# Patient Record
Sex: MALE | Race: WHITE | NOT HISPANIC OR LATINO | Employment: OTHER | RURAL
[De-identification: names, ages, dates, MRNs, and addresses within clinical notes are randomized per-mention and may not be internally consistent; named-entity substitution may affect disease eponyms.]

---

## 2017-02-02 RX ORDER — AMLODIPINE AND VALSARTAN 5; 320 MG/1; MG/1
1 TABLET ORAL NIGHTLY
Qty: 30 TABLET | Refills: 6 | Status: SHIPPED | OUTPATIENT
Start: 2017-02-02 | End: 2017-12-15 | Stop reason: SDUPTHER

## 2017-02-02 RX ORDER — PRAVASTATIN SODIUM 40 MG
40 TABLET ORAL NIGHTLY
Qty: 30 TABLET | Refills: 6 | Status: SHIPPED | OUTPATIENT
Start: 2017-02-02 | End: 2017-12-15 | Stop reason: SDUPTHER

## 2017-12-15 ENCOUNTER — OFFICE VISIT (OUTPATIENT)
Dept: CARDIOLOGY | Facility: CLINIC | Age: 67
End: 2017-12-15

## 2017-12-15 VITALS
DIASTOLIC BLOOD PRESSURE: 80 MMHG | SYSTOLIC BLOOD PRESSURE: 140 MMHG | BODY MASS INDEX: 21.12 KG/M2 | WEIGHT: 164.6 LBS | HEIGHT: 74 IN | HEART RATE: 68 BPM

## 2017-12-15 DIAGNOSIS — Z72.0 TOBACCO ABUSE: ICD-10-CM

## 2017-12-15 DIAGNOSIS — I10 BENIGN HYPERTENSION: ICD-10-CM

## 2017-12-15 DIAGNOSIS — E78.00 HYPERCHOLESTEROLEMIA: ICD-10-CM

## 2017-12-15 DIAGNOSIS — I25.10 CORONARY ARTERY DISEASE INVOLVING NATIVE CORONARY ARTERY OF NATIVE HEART WITHOUT ANGINA PECTORIS: Primary | ICD-10-CM

## 2017-12-15 PROCEDURE — 99214 OFFICE O/P EST MOD 30 MIN: CPT | Performed by: INTERNAL MEDICINE

## 2017-12-15 RX ORDER — AMLODIPINE AND VALSARTAN 5; 320 MG/1; MG/1
1 TABLET ORAL NIGHTLY
Qty: 90 TABLET | Refills: 6 | Status: SHIPPED | OUTPATIENT
Start: 2017-12-15 | End: 2018-12-21 | Stop reason: SDUPTHER

## 2017-12-15 RX ORDER — PRAVASTATIN SODIUM 40 MG
40 TABLET ORAL NIGHTLY
Qty: 90 TABLET | Refills: 1 | Status: SHIPPED | OUTPATIENT
Start: 2017-12-15 | End: 2018-06-20 | Stop reason: SDUPTHER

## 2018-06-20 RX ORDER — PRAVASTATIN SODIUM 40 MG
TABLET ORAL
Qty: 90 TABLET | Refills: 0 | Status: SHIPPED | OUTPATIENT
Start: 2018-06-20 | End: 2018-08-17 | Stop reason: SDUPTHER

## 2018-08-17 RX ORDER — PRAVASTATIN SODIUM 40 MG
40 TABLET ORAL
Qty: 90 TABLET | Refills: 1 | Status: SHIPPED | OUTPATIENT
Start: 2018-08-17 | End: 2019-01-16 | Stop reason: SDUPTHER

## 2018-12-21 RX ORDER — AMLODIPINE AND VALSARTAN 5; 320 MG/1; MG/1
TABLET ORAL
Qty: 90 TABLET | Refills: 0 | Status: SHIPPED | OUTPATIENT
Start: 2018-12-21 | End: 2019-01-14 | Stop reason: SDUPTHER

## 2019-01-14 ENCOUNTER — OFFICE VISIT (OUTPATIENT)
Dept: CARDIOLOGY | Facility: CLINIC | Age: 69
End: 2019-01-14

## 2019-01-14 VITALS
WEIGHT: 176 LBS | HEART RATE: 77 BPM | DIASTOLIC BLOOD PRESSURE: 70 MMHG | BODY MASS INDEX: 23.33 KG/M2 | HEIGHT: 73 IN | SYSTOLIC BLOOD PRESSURE: 134 MMHG

## 2019-01-14 DIAGNOSIS — Z72.0 TOBACCO ABUSE: ICD-10-CM

## 2019-01-14 DIAGNOSIS — I10 BENIGN HYPERTENSION: ICD-10-CM

## 2019-01-14 DIAGNOSIS — I25.10 CORONARY ARTERY DISEASE INVOLVING NATIVE CORONARY ARTERY OF NATIVE HEART WITHOUT ANGINA PECTORIS: Primary | ICD-10-CM

## 2019-01-14 DIAGNOSIS — E78.00 HYPERCHOLESTEROLEMIA: ICD-10-CM

## 2019-01-14 PROCEDURE — 99214 OFFICE O/P EST MOD 30 MIN: CPT | Performed by: INTERNAL MEDICINE

## 2019-01-14 RX ORDER — PREDNISONE 1 MG/1
5 TABLET ORAL DAILY
COMMUNITY
End: 2020-01-20

## 2019-01-14 RX ORDER — AMLODIPINE AND VALSARTAN 5; 320 MG/1; MG/1
1 TABLET ORAL DAILY
Qty: 90 TABLET | Refills: 4 | Status: SHIPPED | OUTPATIENT
Start: 2019-01-14 | End: 2020-01-20 | Stop reason: SDUPTHER

## 2019-01-14 RX ORDER — RANITIDINE 150 MG/1
150 TABLET ORAL 2 TIMES DAILY
COMMUNITY
End: 2020-01-20

## 2019-01-14 RX ORDER — NAPROXEN 500 MG/1
500 TABLET ORAL 2 TIMES DAILY WITH MEALS
COMMUNITY
End: 2021-01-25

## 2019-01-14 RX ORDER — SULFASALAZINE 500 MG/1
1000 TABLET ORAL 2 TIMES DAILY
COMMUNITY
End: 2021-01-25

## 2019-01-14 NOTE — PROGRESS NOTES
[facility]  Office visit  Boo White  1950  [phone]  VISIT DATE:  12/15/2017    PCP: Renny Ardon MD  [address] [place], [place] [id number]    CC:  No chief complaint on file.      PROBLEM LIST:  1. Coronary artery disease with abnormal Cardiolite:  a. A 6-9 month history of progressive shortness of breath/dyspnea upon exertion.  b. Abnormal EKG revealing inferior ST downsloping.  c. Echocardiogram, 05/15/2012: Ejection fraction normal at 60%, mild diastolic dysfunction, trace mitral regurgitation, mild tricuspid regurgitation with an RVSP at 33.  d. Abnormal Lexiscan Cardiolite: The patient walked 3 minutes with abnormal imaging studies.  e. Left heart catheterization, 08/30/2012: Status post FFR and drug-eluting stent to the mid and proximal RCA using a 4.6 mm Promus stent proximally and 3 x 32 in the mid vessel and stenting of the left circumflex using a 2.75 x 16 mm Promus drug-eluting stent.  2. Benign hypertension  3. Hypercholesterolemia  4. Non-insulin-dependent type 2, diabetes mellitus  5. Ongoing tobacco abuse; 2 packs per day  6. Family history of heart disease  7. Surgical history:  a. Right index finger removed secondary to working accident     ASSESSMENT:   Diagnosis Plan   1. Coronary artery disease involving native coronary artery of native heart without angina pectoris     2. Hypercholesterolemia     3. Benign hypertension     4. Tobacco abuse         PLAN:  Coronary artery disease: Currently stable and asymptomatic.  Continue aspirin, afterload reduction and statin therapy.    Hypertension: Goal less than 130/80 mmHg.  Well-controlled at home.  Continue combination of amlodipine and valsartan    Hyperlipidemia: Goal LDL less than 70.  ...    Nicotine abuse: Counseled on need for smoking cessation.  Previously attempted Chantix.  Currently smoking a pack per day, not ready to try quitting at this time.    Subjective  Reports stable functional  status.  Describes shortness of breath and a class II pattern.  Currently smoking a pack per day, previously smoked as much as 2 packs per day.  Blood pressures running less than 140/90 mmHg.  Denies chest discomfort and palpitations.  Has been started on therapy for underlying inflammatory arthritis.    PHYSICAL EXAMINATION:  There were no vitals filed for this visit.  General Appearance:    Alert, cooperative, no distress, appears stated age   Head:    Normocephalic, without obvious abnormality, atraumatic   Eyes:    conjunctiva/corneas clear   Nose:   Nares normal, septum midline, mucosa normal, no drainage   Throat:   Lips, teeth and gums normal   Neck:   Supple, symmetrical, trachea midline, no carotid    bruit or JVD   Lungs:     Diminished throughout, respirations unlabored   Chest Wall:    No tenderness or deformity    Heart:    Regular rate and rhythm, S1 and S2 normal, no murmur, rub   or gallop, normal carotid impulse bilaterally without bruit.   Abdomen:     Soft, non-tender   Extremities:   Extremities normal, atraumatic, no cyanosis or edema   Pulses:   2+ and symmetric all extremities   Skin:   Skin color, texture, turgor normal, no rashes or lesions       Diagnostic Data:  Procedures  Lab Results   Component Value Date    TRIG 53 11/18/2016    HDL 50 11/18/2016     Lab Results   Component Value Date    GLUCOSE 112 (H) 11/18/2016    BUN 17 11/18/2016    CREATININE 0.80 11/18/2016     11/18/2016    K 4.1 11/18/2016     (H) 11/18/2016    CO2 25.0 11/18/2016     Lab Results   Component Value Date    HGBA1C 5.20 11/18/2016     Lab Results   Component Value Date    WBC 5.38 11/18/2016    HGB 14.4 11/18/2016    HCT 43.1 11/18/2016     11/18/2016       Allergies  No Known Allergies    Current Medications    Current Outpatient Medications:   •  amLODIPine-valsartan (EXFORGE) 5-320 MG per tablet, TAKE (1) TABLET DAILY FOR HIGH BLOOD PRESSURE., Disp: 90 tablet, Rfl: 0  •  aspirin 81 MG EC  tablet, Take 81 mg by mouth Every Night., Disp: , Rfl:   •  coenzyme Q10 100 MG capsule, Take 200 mg by mouth Every Night., Disp: , Rfl:   •  methotrexate 2.5 MG tablet, Take 20 mg by mouth 1 (One) Time Per Week., Disp: , Rfl:   •  Multiple Vitamins-Minerals (SENTRY SENIOR PO), Take  by mouth Daily., Disp: , Rfl:   •  pravastatin (PRAVACHOL) 40 MG tablet, Take 1 tablet by mouth every night at bedtime., Disp: 90 tablet, Rfl: 1  •  Specialty Vitamins Products (CENTRUM SPECIALIST ENERGY) tablet, Take 1 tablet by mouth Every Night., Disp: , Rfl:           ROS  Review of Systems   Cardiovascular: Negative for chest pain, irregular heartbeat, near-syncope, palpitations and syncope.   Respiratory: Positive for cough, shortness of breath and wheezing. Negative for sputum production.    Neurological: Positive for dizziness.     SOCIAL HX  Social History     Socioeconomic History   • Marital status:      Spouse name: Not on file   • Number of children: Not on file   • Years of education: Not on file   • Highest education level: Not on file   Social Needs   • Financial resource strain: Not on file   • Food insecurity - worry: Not on file   • Food insecurity - inability: Not on file   • Transportation needs - medical: Not on file   • Transportation needs - non-medical: Not on file   Occupational History   • Not on file   Tobacco Use   • Smoking status: Current Every Day Smoker     Packs/day: 1.50   • Smokeless tobacco: Never Used   • Tobacco comment: 1-1/2 to 2 packs of cigarettes per day    Substance and Sexual Activity   • Alcohol use: No   • Drug use: No   • Sexual activity: Defer   Other Topics Concern   • Not on file   Social History Narrative   • Not on file       FAMILY HX  Family History   Problem Relation Age of Onset   • No Known Problems Mother    • No Known Problems Father              Anthony Riley III, MD, [facility]

## 2019-01-16 RX ORDER — PRAVASTATIN SODIUM 40 MG
40 TABLET ORAL
Qty: 90 TABLET | Refills: 0 | Status: SHIPPED | OUTPATIENT
Start: 2019-01-16 | End: 2019-10-17 | Stop reason: SDUPTHER

## 2019-01-19 ENCOUNTER — RESULTS ENCOUNTER (OUTPATIENT)
Dept: CARDIOLOGY | Facility: CLINIC | Age: 69
End: 2019-01-19

## 2019-01-19 DIAGNOSIS — E78.00 HYPERCHOLESTEROLEMIA: ICD-10-CM

## 2019-01-19 DIAGNOSIS — I10 BENIGN HYPERTENSION: ICD-10-CM

## 2019-10-17 RX ORDER — PRAVASTATIN SODIUM 40 MG
40 TABLET ORAL
Qty: 90 TABLET | Refills: 0 | Status: SHIPPED | OUTPATIENT
Start: 2019-10-17 | End: 2020-01-20

## 2020-01-20 ENCOUNTER — OFFICE VISIT (OUTPATIENT)
Dept: CARDIOLOGY | Facility: CLINIC | Age: 70
End: 2020-01-20

## 2020-01-20 VITALS
HEART RATE: 82 BPM | HEIGHT: 73 IN | WEIGHT: 170.6 LBS | SYSTOLIC BLOOD PRESSURE: 130 MMHG | BODY MASS INDEX: 22.61 KG/M2 | DIASTOLIC BLOOD PRESSURE: 74 MMHG | OXYGEN SATURATION: 96 %

## 2020-01-20 DIAGNOSIS — I25.10 CORONARY ARTERY DISEASE INVOLVING NATIVE CORONARY ARTERY OF NATIVE HEART WITHOUT ANGINA PECTORIS: Primary | ICD-10-CM

## 2020-01-20 DIAGNOSIS — E78.00 HYPERCHOLESTEROLEMIA: ICD-10-CM

## 2020-01-20 DIAGNOSIS — I10 BENIGN HYPERTENSION: ICD-10-CM

## 2020-01-20 PROCEDURE — 99214 OFFICE O/P EST MOD 30 MIN: CPT | Performed by: INTERNAL MEDICINE

## 2020-01-20 RX ORDER — ROSUVASTATIN CALCIUM 40 MG/1
40 TABLET, COATED ORAL DAILY
Qty: 90 TABLET | Refills: 1 | Status: SHIPPED | OUTPATIENT
Start: 2020-01-20 | End: 2020-08-05 | Stop reason: SDUPTHER

## 2020-01-20 RX ORDER — BUDESONIDE AND FORMOTEROL FUMARATE DIHYDRATE 80; 4.5 UG/1; UG/1
2 AEROSOL RESPIRATORY (INHALATION)
COMMUNITY
End: 2022-02-08

## 2020-01-20 RX ORDER — AMLODIPINE AND VALSARTAN 5; 320 MG/1; MG/1
1 TABLET ORAL DAILY
Qty: 90 TABLET | Refills: 4 | Status: SHIPPED | OUTPATIENT
Start: 2020-01-20 | End: 2021-01-25 | Stop reason: SDUPTHER

## 2020-01-20 NOTE — PROGRESS NOTES
[facility]  Office visit  Boo White  1950  [phone]  VISIT DATE:  1/20/2020      PCP: Renny Ardon MD  [address] [place] [id number]    CC:  Chief Complaint   Patient presents with   • Coronary Artery Disease       PROBLEM LIST:  1. Coronary artery disease with abnormal Cardiolite:  a. A 6-9 month history of progressive shortness of breath/dyspnea upon exertion.  b. Abnormal EKG revealing inferior ST downsloping.  c. Echocardiogram, 05/15/2012: Ejection fraction normal at 60%, mild diastolic dysfunction, trace mitral regurgitation, mild tricuspid regurgitation with an RVSP at 33.  d. Abnormal Lexiscan Cardiolite: The patient walked 3 minutes with abnormal imaging studies.  e. Left heart catheterization, 08/30/2012: Status post FFR and drug-eluting stent to the mid and proximal RCA using a 4.6 mm Promus stent proximally and 3 x 32 in the mid vessel and stenting of the left circumflex using a 2.75 x 16 mm Promus drug-eluting stent.  2. Benign hypertension  3. Hypercholesterolemia  4. Non-insulin-dependent type 2, diabetes mellitus  5. Ongoing tobacco abuse; 2 packs per day  6. Family history of heart disease  7. Surgical history:  a. Right index finger removed secondary to working accident     ASSESSMENT:   Diagnosis Plan   1. Coronary artery disease involving native coronary artery of native heart without angina pectoris     2. Benign hypertension     3. Hypercholesterolemia         PLAN:  Coronary artery disease: Currently stable and asymptomatic.  Continue aspirin, afterload reduction and statin therapy.    Hypertension: Goal less than 130/80 mmHg.  Well-controlled at home.  Continue combination of amlodipine and valsartan    Hyperlipidemia: Goal LDL less than 70.  Switching pravastatin over to rosuvastatin 40 mg p.o. daily.    Nicotine abuse: Counseled on need for smoking cessation.  Previously attempted Chantix.  Currently smoking a pack per day,  "not ready to try quitting at this time.    Atypical claudication: We will arrange for ABIs if this worsens.    Subjective  Reports stable functional status.  Describes shortness of breath in a class II pattern with such activities as going up a couple flights of stairs or walking up an incline carrying something.  Currently smoking a pack per day, previously smoked as much as 2 packs per day.  Blood pressures running less than 140/90 mmHg.  Denies chest discomfort and palpitations.  Has been started on therapy for underlying inflammatory arthritis.  Denies exertional claudication.  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  Reports that his feet are often cold    PHYSICAL EXAMINATION:  Vitals:    01/20/20 0921   BP: 130/74   BP Location: Right arm   Patient Position: Sitting   Pulse: 82   SpO2: 96%   Weight: 77.4 kg (170 lb 9.6 oz)   Height: 185.4 cm (73\")     General Appearance:    Alert, cooperative, no distress, appears stated age   Head:    Normocephalic, without obvious abnormality, atraumatic   Eyes:    conjunctiva/corneas clear   Nose:   Nares normal, septum midline, mucosa normal, no drainage   Throat:   Lips, teeth and gums normal   Neck:   Supple, symmetrical, trachea midline, no carotid    bruit or JVD   Lungs:     Diminished throughout, respirations unlabored   Chest Wall:    No tenderness or deformity    Heart:    Regular rate and rhythm, S1 and S2 normal, no murmur, rub   or gallop, normal carotid impulse bilaterally without bruit.   Abdomen:     Soft, non-tender   Extremities:   Extremities normal, atraumatic, no cyanosis or edema   Pulses:  Pedal pulses are difficult to appreciate   Skin:   Skin color, texture, turgor normal, no rashes or lesions       Diagnostic Data:  Procedures  Lab Results   Component Value Date    TRIG 53 11/18/2016    HDL 50 11/18/2016     Lab Results   Component Value Date    GLUCOSE 112 (H) " 11/18/2016    BUN 17 11/18/2016    CREATININE 0.80 11/18/2016     11/18/2016    K 4.1 11/18/2016     (H) 11/18/2016    CO2 25.0 11/18/2016     Lab Results   Component Value Date    HGBA1C 5.20 11/18/2016     Lab Results   Component Value Date    WBC 5.38 11/18/2016    HGB 14.4 11/18/2016    HCT 43.1 11/18/2016     11/18/2016       Allergies  No Known Allergies    Current Medications    Current Outpatient Medications:   •  adalimumab (HUMIRA) 40 MG/0.8ML Prefilled Syringe Kit injection, Inject 40 mg under the skin into the appropriate area as directed Every 14 (Fourteen) Days., Disp: , Rfl:   •  amLODIPine-valsartan (EXFORGE) 5-320 MG per tablet, Take 1 tablet by mouth Daily., Disp: 90 tablet, Rfl: 4  •  aspirin 81 MG EC tablet, Take 81 mg by mouth Every Night., Disp: , Rfl:   •  budesonide-formoterol (SYMBICORT) 80-4.5 MCG/ACT inhaler, Inhale 2 puffs 2 (Two) Times a Day., Disp: , Rfl:   •  coenzyme Q10 100 MG capsule, Take 200 mg by mouth Every Night., Disp: , Rfl:   •  Multiple Vitamins-Minerals (SENTRY SENIOR PO), Take  by mouth Daily., Disp: , Rfl:   •  naproxen (NAPROSYN) 500 MG tablet, Take 500 mg by mouth 2 (Two) Times a Day With Meals., Disp: , Rfl:   •  pravastatin (PRAVACHOL) 40 MG tablet, Take 1 tablet by mouth every night at bedtime., Disp: 90 tablet, Rfl: 0  •  sulfaSALAzine (AZULFIDINE) 500 MG tablet, Take 1,000 mg by mouth 2 (Two) Times a Day., Disp: , Rfl:           ROS  Review of Systems   Cardiovascular: Positive for dyspnea on exertion. Negative for chest pain, irregular heartbeat, near-syncope, palpitations and syncope.   Respiratory: Positive for cough, shortness of breath and wheezing. Negative for sputum production.    Neurological: Positive for dizziness.     SOCIAL HX  Social History     Socioeconomic History   • Marital status:      Spouse name: Not on file   • Number of children: Not on file   • Years of education: Not on file   • Highest education level: Not on  file   Tobacco Use   • Smoking status: Current Every Day Smoker     Packs/day: 1.00     Types: Cigarettes   • Smokeless tobacco: Never Used   Substance and Sexual Activity   • Alcohol use: No   • Drug use: No   • Sexual activity: Defer       FAMILY HX  Family History   Problem Relation Age of Onset   • No Known Problems Mother    • No Known Problems Father              Anthony Riley III, MD, FACC

## 2020-08-05 RX ORDER — ROSUVASTATIN CALCIUM 40 MG/1
40 TABLET, COATED ORAL DAILY
Qty: 90 TABLET | Refills: 0 | Status: SHIPPED | OUTPATIENT
Start: 2020-08-05 | End: 2021-01-04 | Stop reason: SDUPTHER

## 2021-01-04 RX ORDER — ROSUVASTATIN CALCIUM 40 MG/1
40 TABLET, COATED ORAL DAILY
Qty: 90 TABLET | Refills: 0 | Status: SHIPPED | OUTPATIENT
Start: 2021-01-04 | End: 2021-01-25 | Stop reason: SDUPTHER

## 2021-01-25 ENCOUNTER — TELEPHONE (OUTPATIENT)
Dept: CARDIOLOGY | Facility: CLINIC | Age: 71
End: 2021-01-25

## 2021-01-25 ENCOUNTER — OFFICE VISIT (OUTPATIENT)
Dept: CARDIOLOGY | Facility: CLINIC | Age: 71
End: 2021-01-25

## 2021-01-25 VITALS
WEIGHT: 170 LBS | DIASTOLIC BLOOD PRESSURE: 78 MMHG | OXYGEN SATURATION: 96 % | BODY MASS INDEX: 22.53 KG/M2 | SYSTOLIC BLOOD PRESSURE: 138 MMHG | HEIGHT: 73 IN | HEART RATE: 89 BPM | TEMPERATURE: 98.2 F

## 2021-01-25 DIAGNOSIS — I10 BENIGN HYPERTENSION: ICD-10-CM

## 2021-01-25 DIAGNOSIS — I73.9 PVD (PERIPHERAL VASCULAR DISEASE) WITH CLAUDICATION (HCC): ICD-10-CM

## 2021-01-25 DIAGNOSIS — I25.10 CORONARY ARTERY DISEASE INVOLVING NATIVE CORONARY ARTERY OF NATIVE HEART WITHOUT ANGINA PECTORIS: Primary | ICD-10-CM

## 2021-01-25 DIAGNOSIS — Z72.0 TOBACCO ABUSE: ICD-10-CM

## 2021-01-25 DIAGNOSIS — E78.00 HYPERCHOLESTEROLEMIA: ICD-10-CM

## 2021-01-25 PROCEDURE — 99214 OFFICE O/P EST MOD 30 MIN: CPT | Performed by: INTERNAL MEDICINE

## 2021-01-25 RX ORDER — INFLUENZA A VIRUS A/MICHIGAN/45/2015 X-275 (H1N1) ANTIGEN (FORMALDEHYDE INACTIVATED), INFLUENZA A VIRUS A/SINGAPORE/INFIMH-16-0019/2016 IVR-186 (H3N2) ANTIGEN (FORMALDEHYDE INACTIVATED), INFLUENZA B VIRUS B/PHUKET/3073/2013 ANTIGEN (FORMALDEHYDE INACTIVATED), AND INFLUENZA B VIRUS B/MARYLAND/15/2016 BX-69A ANTIGEN (FORMALDEHYDE INACTIVATED) 60; 60; 60; 60 UG/.7ML; UG/.7ML; UG/.7ML; UG/.7ML
INJECTION, SUSPENSION INTRAMUSCULAR
COMMUNITY
Start: 2020-11-11 | End: 2022-01-26

## 2021-01-25 RX ORDER — AMLODIPINE AND VALSARTAN 5; 320 MG/1; MG/1
1 TABLET ORAL DAILY
Qty: 90 TABLET | Refills: 4 | Status: SHIPPED | OUTPATIENT
Start: 2021-01-25 | End: 2022-01-26 | Stop reason: SDUPTHER

## 2021-01-25 RX ORDER — ROSUVASTATIN CALCIUM 40 MG/1
40 TABLET, COATED ORAL DAILY
Qty: 90 TABLET | Refills: 4 | Status: SHIPPED | OUTPATIENT
Start: 2021-01-25 | End: 2022-01-26 | Stop reason: SDUPTHER

## 2021-01-25 NOTE — PROGRESS NOTES
[place] [facility]  Office visit  Boo White  1950  [phone]  VISIT DATE:  1/25/2021      PCP: Renny Ardon MD  [address] [place] [id number]    CC:  Chief Complaint   Patient presents with   • Coronary Artery Disease       PROBLEM LIST:  1. Coronary artery disease with abnormal Cardiolite:  a. A 6-9 month history of progressive shortness of breath/dyspnea upon exertion.  b. Abnormal EKG revealing inferior ST downsloping.  c. Echocardiogram, 05/15/2012: Ejection fraction normal at 60%, mild diastolic dysfunction, trace mitral regurgitation, mild tricuspid regurgitation with an RVSP at 33.  d. Abnormal Lexiscan Cardiolite: The patient walked 3 minutes with abnormal imaging studies.  e. Left heart catheterization, 08/30/2012: Status post FFR and drug-eluting stent to the mid and proximal RCA using a 4.6 mm Promus stent proximally and 3 x 32 in the mid vessel and stenting of the left circumflex using a 2.75 x 16 mm Promus drug-eluting stent.  2. Benign hypertension  3. Hypercholesterolemia  4. Non-insulin-dependent type 2, diabetes mellitus  5. Ongoing tobacco abuse; 2 packs per day  6. Family history of heart disease  7. Surgical history:  a. Right index finger removed secondary to working accident     ASSESSMENT:   Diagnosis Plan   1. Coronary artery disease involving native coronary artery of native heart without angina pectoris     2. Benign hypertension     3. Hypercholesterolemia  Lipid Panel    Comprehensive Metabolic Panel   4. Tobacco abuse     5. PVD (peripheral vascular disease) with claudication (CMS/HCC)  Doppler Arterial Multi Level Lower Extremity - Bilateral CAR       PLAN:  Coronary artery disease: Currently stable and asymptomatic.  Continue aspirin, afterload reduction and statin therapy.    Hypertension: Goal less than 130/80 mmHg.  Well-controlled at home.  Continue combination of amlodipine and valsartan    Hyperlipidemia: Goal LDL less than  "70.  Continue rosuvastatin 40 mg p.o. daily.  Lipid profile pending.    Nicotine abuse: Counseled on need for smoking cessation.  Previously attempted Chantix.  Currently smoking a pack per day, not ready to try quitting at this time.    Atypical claudication: ABIs pending.    Dyspepsia: Currently no high risk clinical features.  Counseled him to follow-up with primary care physician for further evaluation and treatment.    Subjective  Reports stable functional status.  Describes shortness of breath in a class II pattern with such activities as going up a couple flights of stairs or walking up an incline carrying something.  Currently smoking a pack per day, previously smoked as much as 2 packs per day.  Blood pressures running less than 140/90 mmHg.  Denies chest discomfort and palpitations.  Reporting cold feet and intermittent nocturnal lower extremity muscle cramping.  He appears compliant with medical therapy.  Describes episodes of indigestion and heartburn following meals.  Denies weight loss.  No melena.  No dysphagia.    PHYSICAL EXAMINATION:  Vitals:    01/25/21 0908   BP: 138/78   Pulse: 89   Temp: 98.2 °F (36.8 °C)   SpO2: 96%   Weight: 77.1 kg (170 lb)   Height: 185.4 cm (72.99\")     General Appearance:    Alert, cooperative, no distress, appears stated age   Head:    Normocephalic, without obvious abnormality, atraumatic   Eyes:    conjunctiva/corneas clear   Nose:   Nares normal, septum midline, mucosa normal, no drainage   Throat:   Lips, teeth and gums normal   Neck:   Supple, symmetrical, trachea midline, no carotid    bruit or JVD   Lungs:     Diminished throughout, respirations unlabored   Chest Wall:    No tenderness or deformity    Heart:    Regular rate and rhythm, S1 and S2 normal, no murmur, rub   or gallop, normal carotid impulse bilaterally without bruit.   Abdomen:     Soft, non-tender   Extremities:   Extremities normal, atraumatic, no cyanosis or edema   Pulses:  Pedal pulses are " difficult to appreciate   Skin:   Skin color, texture, turgor normal, no rashes or lesions       Diagnostic Data:  Procedures  Lab Results   Component Value Date    TRIG 53 11/18/2016    HDL 50 11/18/2016     Lab Results   Component Value Date    GLUCOSE 112 (H) 11/18/2016    BUN 17 11/18/2016    CREATININE 0.80 11/18/2016     11/18/2016    K 4.1 11/18/2016     (H) 11/18/2016    CO2 25.0 11/18/2016     Lab Results   Component Value Date    HGBA1C 5.20 11/18/2016     Lab Results   Component Value Date    WBC 5.38 11/18/2016    HGB 14.4 11/18/2016    HCT 43.1 11/18/2016     11/18/2016       Allergies  No Known Allergies    Current Medications    Current Outpatient Medications:   •  adalimumab (HUMIRA) 40 MG/0.8ML Prefilled Syringe Kit injection, Inject 40 mg under the skin into the appropriate area as directed Every 14 (Fourteen) Days., Disp: , Rfl:   •  amLODIPine-valsartan (EXFORGE) 5-320 MG per tablet, Take 1 tablet by mouth Daily., Disp: 90 tablet, Rfl: 4  •  aspirin 81 MG EC tablet, Take 81 mg by mouth Every Night., Disp: , Rfl:   •  budesonide-formoterol (SYMBICORT) 80-4.5 MCG/ACT inhaler, Inhale 2 puffs 2 (Two) Times a Day., Disp: , Rfl:   •  Multiple Vitamins-Minerals (SENTRY SENIOR PO), Take  by mouth Daily., Disp: , Rfl:   •  rosuvastatin (CRESTOR) 40 MG tablet, Take 1 tablet by mouth Daily., Disp: 90 tablet, Rfl: 4  •  Fluzone High-Dose Quadrivalent 0.7 ML suspension prefilled syringe, , Disp: , Rfl:           ROS  Review of Systems   Cardiovascular: Positive for dyspnea on exertion. Negative for chest pain, irregular heartbeat, near-syncope, palpitations and syncope.   Respiratory: Positive for cough, shortness of breath and wheezing. Negative for sputum production.    Neurological: Positive for dizziness.     SOCIAL HX  Social History     Socioeconomic History   • Marital status:      Spouse name: Not on file   • Number of children: Not on file   • Years of education: Not on file    • Highest education level: Not on file   Tobacco Use   • Smoking status: Current Every Day Smoker     Packs/day: 1.00     Types: Cigarettes   • Smokeless tobacco: Never Used   Substance and Sexual Activity   • Alcohol use: No   • Drug use: No   • Sexual activity: Defer       FAMILY HX  Family History   Problem Relation Age of Onset   • No Known Problems Mother    • No Known Problems Father              Anthony Riley III, MD, FACC

## 2021-01-25 NOTE — TELEPHONE ENCOUNTER
Called patient to inform him of message above from . No answer. Left voice message to call our office.

## 2021-01-25 NOTE — TELEPHONE ENCOUNTER
----- Message from Anthony Riley III, MD sent at 1/25/2021 10:43 AM EST -----  Cholesterol numbers look good, continue current medical therapy.

## 2021-01-27 LAB
ALBUMIN SERPL-MCNC: 3.8 G/DL (ref 3.5–5.2)
ALBUMIN/GLOB SERPL: 1.2 G/DL
ALP SERPL-CCNC: 97 U/L (ref 39–117)
ALT SERPL-CCNC: 30 U/L (ref 1–41)
AST SERPL-CCNC: 28 U/L (ref 1–40)
BILIRUB SERPL-MCNC: 0.5 MG/DL (ref 0.2–1.2)
BUN SERPL-MCNC: 14 MG/DL (ref 8–23)
BUN/CREAT SERPL: 17.7 (ref 7–25)
CALCIUM SERPL-MCNC: 9 MG/DL (ref 8.6–10.5)
CHLORIDE SERPL-SCNC: 104 MMOL/L (ref 98–107)
CHOLEST SERPL-MCNC: 141 MG/DL (ref 0–200)
CO2 SERPL-SCNC: 26 MMOL/L (ref 22–29)
CREAT SERPL-MCNC: 0.79 MG/DL (ref 0.76–1.27)
GLOBULIN SER CALC-MCNC: 3.1 GM/DL
GLUCOSE SERPL-MCNC: 121 MG/DL (ref 65–99)
HDLC SERPL-MCNC: 54 MG/DL (ref 40–60)
LDLC SERPL CALC-MCNC: 75 MG/DL (ref 0–99)
POTASSIUM SERPL-SCNC: 4.2 MMOL/L (ref 3.5–5.2)
PROT SERPL-MCNC: 6.9 G/DL (ref 6–8.5)
SODIUM SERPL-SCNC: 139 MMOL/L (ref 136–145)
TRIGL SERPL-MCNC: 59 MG/DL (ref 0–150)
VLDLC SERPL CALC-MCNC: 12 MG/DL

## 2021-02-22 ENCOUNTER — HOSPITAL ENCOUNTER (OUTPATIENT)
Dept: CARDIOLOGY | Facility: HOSPITAL | Age: 71
Discharge: HOME | End: 2021-02-22
Admitting: INTERNAL MEDICINE

## 2021-02-22 DIAGNOSIS — I73.9 PVD (PERIPHERAL VASCULAR DISEASE) WITH CLAUDICATION (HCC): ICD-10-CM

## 2021-02-22 LAB
BH CV LOWER ARTERIAL LEFT ABI RATIO: 1.66
BH CV LOWER ARTERIAL LEFT CALF RATIO: 1.43
BH CV LOWER ARTERIAL LEFT DORSALIS PEDIS SYS MAX: 135 MMHG
BH CV LOWER ARTERIAL LEFT GREAT TOE SYS MAX: 58 MMHG
BH CV LOWER ARTERIAL LEFT HIGH THIGH SYS MAX: 166 MMHG
BH CV LOWER ARTERIAL LEFT LOW THIGH SYS MAX: 150 MMHG
BH CV LOWER ARTERIAL LEFT POPLITEAL SYS MAX: 160 MMHG
BH CV LOWER ARTERIAL LEFT POST TIBIAL SYS MAX: 186 MMHG
BH CV LOWER ARTERIAL LEFT TBI RATIO: 0.52
BH CV LOWER ARTERIAL RIGHT ABI RATIO: 1.45
BH CV LOWER ARTERIAL RIGHT CALF RATIO: 1.3
BH CV LOWER ARTERIAL RIGHT DORSALIS PEDIS SYS MAX: 159 MMHG
BH CV LOWER ARTERIAL RIGHT GREAT TOE SYS MAX: 73 MMHG
BH CV LOWER ARTERIAL RIGHT HIGH THIGH SYS MAX: 128 MMHG
BH CV LOWER ARTERIAL RIGHT LOW THIGH SYS MAX: 133 MMHG
BH CV LOWER ARTERIAL RIGHT POPLITEAL SYS MAX: 146 MMHG
BH CV LOWER ARTERIAL RIGHT POST TIBIAL SYS MAX: 162 MMHG
BH CV LOWER ARTERIAL RIGHT TBI RATIO: 0.65
UPPER ARTERIAL LEFT ARM BRACHIAL SYS MAX: 111 MMHG
UPPER ARTERIAL RIGHT ARM BRACHIAL SYS MAX: 112 MMHG

## 2021-02-22 PROCEDURE — 93923 UPR/LXTR ART STDY 3+ LVLS: CPT

## 2021-02-22 PROCEDURE — 93923 UPR/LXTR ART STDY 3+ LVLS: CPT | Performed by: INTERNAL MEDICINE

## 2021-02-23 ENCOUNTER — TELEPHONE (OUTPATIENT)
Dept: CARDIOLOGY | Facility: CLINIC | Age: 71
End: 2021-02-23

## 2021-02-23 NOTE — TELEPHONE ENCOUNTER
----- Message from Anthony Riley III, MD sent at 2/23/2021  8:38 AM EST -----  No evidence of blockages in the arteries feeding blood supply to your legs.

## 2021-08-04 NOTE — PROGRESS NOTES
[facility]  Office visit  Boo White  1950  [phone]  VISIT DATE:  12/15/2017    PCP: Renny Ardon MD  [address] [place] [address]    CC:  No chief complaint on file.      PROBLEM LIST:  1. Coronary artery disease with abnormal Cardiolite:  a. A 6-9 month history of progressive shortness of breath/dyspnea upon exertion.  b. Abnormal EKG revealing inferior ST downsloping.  c. Echocardiogram, 05/15/2012: Ejection fraction normal at 60%, mild diastolic dysfunction, trace mitral regurgitation, mild tricuspid regurgitation with an RVSP at 33.  d. Abnormal Lexiscan Cardiolite: The patient walked 3 minutes with abnormal imaging studies.  e. Left heart catheterization, 08/30/2012: Status post FFR and drug-eluting stent to the mid and proximal RCA using a 4.6 mm Promus stent proximally and 3 x 32 in the mid vessel and stenting of the left circumflex using a 2.75 x 16 mm Promus drug-eluting stent.  2. Benign hypertension  3. Hypercholesterolemia  4. Non-insulin-dependent type 2, diabetes mellitus  5. Ongoing tobacco abuse; 2 packs per day  6. Family history of heart disease  7. Surgical history:  a. Right index finger removed secondary to working accident     ASSESSMENT:   Diagnosis Plan   1. Coronary artery disease involving native coronary artery of native heart without angina pectoris     2. Benign hypertension     3. Hypercholesterolemia     4. Tobacco abuse         PLAN:  Coronary artery disease: Currently stable and asymptomatic.  Continue aspirin, afterload reduction and statin therapy.    Hypertension: Goal less than 130/80 mmHg.  Well-controlled at home, intermittent orthostasis.  Continue combination of amlodipine and valsartan    Hyperlipidemia: Goal LDL less than 70.  Intolerant to other more potent statins.  Continue pravastatin.    Nicotine abuse: Counseled on need for smoking cessation.  Was previously able to quit on Chantix.  Currently smoking a  vomiting pack per day, not ready to try quitting at this time.    Subjective  Reports stable functional status.  Describes shortness of breath and a class II pattern with such activities as carrying something heavy walking on level ground or going up a flight of stairs.  Intermittently productive cough.  Currently smoking a pack per day.  Blood pressures running less than 140/90 mmHg.  Denies chest discomfort and palpitations.  Has intermittent lightheadedness if he gets up from a seated position rapidly.    PHYSICAL EXAMINATION:  There were no vitals filed for this visit.  General Appearance:    Alert, cooperative, no distress, appears stated age   Head:    Normocephalic, without obvious abnormality, atraumatic   Eyes:    conjunctiva/corneas clear   Nose:   Nares normal, septum midline, mucosa normal, no drainage   Throat:   Lips, teeth and gums normal   Neck:   Supple, symmetrical, trachea midline, no carotid    bruit or JVD   Lungs:     Diminished throughout, respirations unlabored   Chest Wall:    No tenderness or deformity    Heart:    Regular rate and rhythm, S1 and S2 normal, no murmur, rub   or gallop, normal carotid impulse bilaterally without bruit.   Abdomen:     Soft, non-tender   Extremities:   Extremities normal, atraumatic, no cyanosis or edema   Pulses:   2+ and symmetric all extremities   Skin:   Skin color, texture, turgor normal, no rashes or lesions       Diagnostic Data:  Procedures  Lab Results   Component Value Date    TRIG 53 11/18/2016    HDL 50 11/18/2016    LDLDIRECT 82 11/18/2016     Lab Results   Component Value Date    GLUCOSE 112 (H) 11/18/2016    BUN 17 11/18/2016    CREATININE 0.80 11/18/2016     11/18/2016    K 4.1 11/18/2016     (H) 11/18/2016    CO2 25.0 11/18/2016     Lab Results   Component Value Date    HGBA1C 5.20 11/18/2016     Lab Results   Component Value Date    WBC 5.38 11/18/2016    HGB 14.4 11/18/2016    HCT 43.1 11/18/2016     11/18/2016       Allergies  No  Known Allergies    Current Medications    Current Outpatient Prescriptions:   •  amLODIPine-valsartan (EXFORGE) 5-320 MG per tablet, Take 1 tablet by mouth Every Night., Disp: 30 tablet, Rfl: 6  •  aspirin 81 MG EC tablet, Take 81 mg by mouth Every Night., Disp: , Rfl:   •  coenzyme Q10 100 MG capsule, Take 200 mg by mouth Every Night., Disp: , Rfl:   •  pravastatin (PRAVACHOL) 40 MG tablet, Take 1 tablet by mouth Every Night., Disp: 30 tablet, Rfl: 6  •  Specialty Vitamins Products (CENTRUM SPECIALIST ENERGY) tablet, Take 1 tablet by mouth Every Night., Disp: , Rfl:           ROS  Review of Systems   Cardiovascular: Negative for chest pain, irregular heartbeat, near-syncope, palpitations and syncope.   Respiratory: Positive for cough, shortness of breath and wheezing. Negative for sputum production.      SOCIAL HX  Social History     Social History   • Marital status:      Spouse name: N/A   • Number of children: N/A   • Years of education: N/A     Occupational History   • Not on file.     Social History Main Topics   • Smoking status: Current Every Day Smoker     Packs/day: 1.50   • Smokeless tobacco: Not on file      Comment: 1-1/2 to 2 packs of cigarettes per day    • Alcohol use No   • Drug use: No   • Sexual activity: Not on file     Other Topics Concern   • Not on file     Social History Narrative       FAMILY HX  No family history on file.          Anthony Riley III, MD, FACC

## 2022-01-26 ENCOUNTER — LAB (OUTPATIENT)
Dept: LAB | Facility: HOSPITAL | Age: 72
End: 2022-01-26

## 2022-01-26 ENCOUNTER — OFFICE VISIT (OUTPATIENT)
Dept: CARDIOLOGY | Facility: CLINIC | Age: 72
End: 2022-01-26

## 2022-01-26 VITALS
HEIGHT: 73 IN | DIASTOLIC BLOOD PRESSURE: 68 MMHG | OXYGEN SATURATION: 98 % | SYSTOLIC BLOOD PRESSURE: 142 MMHG | WEIGHT: 170 LBS | BODY MASS INDEX: 22.53 KG/M2 | HEART RATE: 80 BPM

## 2022-01-26 DIAGNOSIS — J44.9 CHRONIC OBSTRUCTIVE PULMONARY DISEASE, UNSPECIFIED COPD TYPE: ICD-10-CM

## 2022-01-26 DIAGNOSIS — I10 BENIGN HYPERTENSION: ICD-10-CM

## 2022-01-26 DIAGNOSIS — I25.10 CORONARY ARTERY DISEASE INVOLVING NATIVE CORONARY ARTERY OF NATIVE HEART WITHOUT ANGINA PECTORIS: Primary | ICD-10-CM

## 2022-01-26 DIAGNOSIS — E78.00 HYPERCHOLESTEROLEMIA: ICD-10-CM

## 2022-01-26 DIAGNOSIS — R10.13 DYSPEPSIA: ICD-10-CM

## 2022-01-26 LAB
CHOLEST SERPL-MCNC: 135 MG/DL (ref 0–200)
HDLC SERPL-MCNC: 46 MG/DL (ref 40–60)
LDLC SERPL CALC-MCNC: 73 MG/DL (ref 0–100)
TRIGL SERPL-MCNC: 80 MG/DL (ref 0–150)
VLDLC SERPL CALC-MCNC: 16 MG/DL (ref 5–40)

## 2022-01-26 PROCEDURE — 93000 ELECTROCARDIOGRAM COMPLETE: CPT | Performed by: INTERNAL MEDICINE

## 2022-01-26 PROCEDURE — 99214 OFFICE O/P EST MOD 30 MIN: CPT | Performed by: INTERNAL MEDICINE

## 2022-01-26 RX ORDER — ACETAMINOPHEN 500 MG
650 TABLET ORAL EVERY 6 HOURS PRN
COMMUNITY
End: 2022-08-02

## 2022-01-26 RX ORDER — ALBUTEROL SULFATE 90 UG/1
2 AEROSOL, METERED RESPIRATORY (INHALATION) EVERY 4 HOURS PRN
COMMUNITY
End: 2022-12-07 | Stop reason: SDUPTHER

## 2022-01-26 RX ORDER — ROSUVASTATIN CALCIUM 40 MG/1
40 TABLET, COATED ORAL DAILY
Qty: 90 TABLET | Refills: 4 | Status: SHIPPED | OUTPATIENT
Start: 2022-01-26 | End: 2023-01-30 | Stop reason: SDUPTHER

## 2022-01-26 RX ORDER — AMLODIPINE AND VALSARTAN 5; 320 MG/1; MG/1
1 TABLET ORAL DAILY
Qty: 90 TABLET | Refills: 4 | Status: SHIPPED | OUTPATIENT
Start: 2022-01-26 | End: 2023-01-30 | Stop reason: SDUPTHER

## 2022-01-26 NOTE — PROGRESS NOTES
[facility]  Office visit  Boo White  1950  [phone]  VISIT DATE:  1/26/2022      PCP: Renny Ardon MD  [address] [place] [id number]    CC:  Chief Complaint   Patient presents with   • Coronary Artery Disease   • Shortness of Breath       PROBLEM LIST:  1. Coronary artery disease with abnormal Cardiolite:  a. A 6-9 month history of progressive shortness of breath/dyspnea upon exertion.  b. Abnormal EKG revealing inferior ST downsloping.  c. Echocardiogram, 05/15/2012: Ejection fraction normal at 60%, mild diastolic dysfunction, trace mitral regurgitation, mild tricuspid regurgitation with an RVSP at 33.  d. Abnormal Lexiscan Cardiolite: The patient walked 3 minutes with abnormal imaging studies.  e. Left heart catheterization, 08/30/2012: Status post FFR and drug-eluting stent to the mid and proximal RCA using a 4.6 mm Promus stent proximally and 3 x 32 in the mid vessel and stenting of the left circumflex using a 2.75 x 16 mm Promus drug-eluting stent.  2. Benign hypertension  3. Hypercholesterolemia  4. Non-insulin-dependent type 2, diabetes mellitus  5. Ongoing tobacco abuse; 2 packs per day  6. Family history of heart disease  7. Surgical history:  a. Right index finger removed secondary to working accident     February 2021 ABIs: Normal    ASSESSMENT:   Diagnosis Plan   1. Coronary artery disease involving native coronary artery of native heart without angina pectoris     2. Benign hypertension     3. Hypercholesterolemia         PLAN:  Coronary artery disease: Currently stable and asymptomatic.  Continue aspirin, afterload reduction and statin therapy.    Hypertension: Goal less than 130/80 mmHg.  Well-controlled at home.  Continue combination of amlodipine and valsartan    Hyperlipidemia: Goal LDL less than 70.  Continue rosuvastatin 40 mg p.o. daily.  Lipid profile pending.    Nicotine abuse: Counseled on need for smoking cessation.   "Previously attempted Chantix.  Currently smoking a pack per day, not ready to try quitting at this time.    Dyspepsia: Chronic.  Recommending GI consultation.    COPD, severe based on PFTs: Smoking cessation.  Pulmonology consultation.    Subjective  Reports stable functional status.  Describes shortness of breath in a class III pattern with such activities as going up a flights of stairs or walking up an incline carrying something.  Currently smoking a pack per day, previously smoked as much as 2 packs per day.  Blood pressures running less than 140/90 mmHg.  Denies chest discomfort and palpitations.  He appears compliant with medical therapy.  Describes episodes of indigestion and heartburn following meals.  Denies weight loss.  No melena.  No dysphagia.    PHYSICAL EXAMINATION:  Vitals:    01/26/22 0911   Weight: 77.1 kg (170 lb)   Height: 185.4 cm (73\")     General Appearance:    Alert, cooperative, no distress, appears stated age   Head:    Normocephalic, without obvious abnormality, atraumatic   Eyes:    conjunctiva/corneas clear   Nose:   Nares normal, septum midline, mucosa normal, no drainage   Throat:   Lips, teeth and gums normal   Neck:   Supple, symmetrical, trachea midline, no carotid    bruit or JVD   Lungs:     Diminished throughout, respirations unlabored   Chest Wall:    No tenderness or deformity    Heart:    Regular rate and rhythm, S1 and S2 normal, no murmur, rub   or gallop, normal carotid impulse bilaterally without bruit.   Abdomen:     Soft, non-tender   Extremities:   Extremities normal, atraumatic, no cyanosis or edema   Pulses:  Pedal pulses are difficult to appreciate   Skin:   Skin color, texture, turgor normal, no rashes or lesions       Diagnostic Data:    ECG 12 Lead    Date/Time: 1/26/2022 9:26 AM  Performed by: Anthony Riley III, MD  Authorized by: Anthony Riley III, MD   Comparison: compared with previous ECG from 11/18/2016  Similar to previous ECG  Rhythm: sinus rhythm  Other " findings: left ventricular hypertrophy    Clinical impression: abnormal EKG          Lab Results   Component Value Date    CHLPL 141 01/25/2021    TRIG 59 01/25/2021    HDL 54 01/25/2021     Lab Results   Component Value Date    GLUCOSE 121 (H) 01/25/2021    BUN 14 01/25/2021    CREATININE 0.79 01/25/2021     01/25/2021    K 4.2 01/25/2021     01/25/2021    CO2 26.0 01/25/2021     Lab Results   Component Value Date    HGBA1C 5.20 11/18/2016     Lab Results   Component Value Date    WBC 5.38 11/18/2016    HGB 14.4 11/18/2016    HCT 43.1 11/18/2016     11/18/2016       Allergies  No Known Allergies    Current Medications    Current Outpatient Medications:   •  adalimumab (HUMIRA) 40 MG/0.8ML Prefilled Syringe Kit injection, Inject 40 mg under the skin into the appropriate area as directed Every 14 (Fourteen) Days., Disp: , Rfl:   •  amLODIPine-valsartan (EXFORGE) 5-320 MG per tablet, Take 1 tablet by mouth Daily., Disp: 90 tablet, Rfl: 4  •  aspirin 81 MG EC tablet, Take 81 mg by mouth Every Night., Disp: , Rfl:   •  budesonide-formoterol (SYMBICORT) 80-4.5 MCG/ACT inhaler, Inhale 2 puffs 2 (Two) Times a Day., Disp: , Rfl:   •  Fluzone High-Dose Quadrivalent 0.7 ML suspension prefilled syringe, , Disp: , Rfl:   •  Multiple Vitamins-Minerals (SENTRY SENIOR PO), Take  by mouth Daily., Disp: , Rfl:   •  rosuvastatin (CRESTOR) 40 MG tablet, Take 1 tablet by mouth Daily., Disp: 90 tablet, Rfl: 4          ROS  Review of Systems   Cardiovascular: Positive for dyspnea on exertion. Negative for chest pain, irregular heartbeat, near-syncope, palpitations and syncope.   Respiratory: Positive for cough, shortness of breath and wheezing. Negative for sputum production.    Neurological: Positive for dizziness.     SOCIAL HX  Social History     Socioeconomic History   • Marital status:    Tobacco Use   • Smoking status: Current Every Day Smoker     Packs/day: 1.00     Types: Cigarettes   • Smokeless  tobacco: Never Used   Substance and Sexual Activity   • Alcohol use: No   • Drug use: No   • Sexual activity: Defer       FAMILY HX  Family History   Problem Relation Age of Onset   • No Known Problems Mother    • No Known Problems Father              Anthony Riley III, MD, FACC

## 2022-01-27 ENCOUNTER — TELEPHONE (OUTPATIENT)
Dept: CARDIOLOGY | Facility: CLINIC | Age: 72
End: 2022-01-27

## 2022-01-27 NOTE — TELEPHONE ENCOUNTER
----- Message from Anthony Riley III, MD sent at 1/27/2022 10:35 AM EST -----  Cholesterol numbers look great, continue current medical therapy.

## 2022-02-08 ENCOUNTER — OFFICE VISIT (OUTPATIENT)
Dept: PULMONOLOGY | Facility: CLINIC | Age: 72
End: 2022-02-08

## 2022-02-08 VITALS
HEART RATE: 78 BPM | TEMPERATURE: 98.9 F | SYSTOLIC BLOOD PRESSURE: 154 MMHG | RESPIRATION RATE: 16 BRPM | BODY MASS INDEX: 22.81 KG/M2 | HEIGHT: 73 IN | DIASTOLIC BLOOD PRESSURE: 102 MMHG | WEIGHT: 172.13 LBS | OXYGEN SATURATION: 96 %

## 2022-02-08 DIAGNOSIS — F17.210 NICOTINE DEPENDENCE, CIGARETTES, UNCOMPLICATED: ICD-10-CM

## 2022-02-08 DIAGNOSIS — J44.9 CHRONIC OBSTRUCTIVE PULMONARY DISEASE, UNSPECIFIED COPD TYPE: Primary | ICD-10-CM

## 2022-02-08 DIAGNOSIS — Z00.6 EXAMINATION FOR NORMAL COMPARISON OR CONTROL IN CLINICAL RESEARCH: Primary | ICD-10-CM

## 2022-02-08 PROCEDURE — 99204 OFFICE O/P NEW MOD 45 MIN: CPT | Performed by: INTERNAL MEDICINE

## 2022-02-08 NOTE — PROGRESS NOTES
"CC:    Shortness of breath    HPI:    71 y.o. WM w/ h/o extensive tobacco abuse ~100 py+ - started age 15 - max 3ppd - currently 1 ppd, COPD, HTN, HLD, CAD s/p stents, RA on Humira for ~2 years, who is referred today for evaluation of dyspnea and COPD.  Patient does note PRESCOTT, particularly worse with inclines, carrying things, and for longer distances on flat surface.  Prescribed symbicort.  Hardly ever uses albuterol.  Does have cough, worse in mornings.  No hemoptysis.  Follows with Rheum at .  Had negative quantiferon in 2018.  Prior CT scans from 11/2021 and 12/2019 show emphysema, scattered granulomas, calcified pleural plaques, and RUL cavity which has remained stable.   PFT at  read as \"severe obstruction\", actual numbers / report pending.    PMH:    Past Medical History:   Diagnosis Date   • Arthritis    • Benign hypertension    • CAD (coronary artery disease)    • Dyspnea    • Family history of heart disease    • GERD (gastroesophageal reflux disease)    • Hypercholesterolemia    • Tobacco abuse      2 packs per day     PSH:    Past Surgical History:   Procedure Laterality Date   • CARDIAC CATHETERIZATION     • CARDIAC CATHETERIZATION N/A 11/18/2016    Procedure: Left Heart Cath;  Surgeon: Derrick Ontiveros MD;  Location: [facility] CATH INVASIVE LOCATION;  Service:    • CORONARY STENT PLACEMENT     • FINGER SURGERY      Right index finger removed secondary to working accident     FH:    Family History   Problem Relation Age of Onset   • No Known Problems Mother    • No Known Problems Father      SH:    Social History     Socioeconomic History   • Marital status:    Tobacco Use   • Smoking status: Current Every Day Smoker     Packs/day: 1.00     Years: 45.00     Pack years: 45.00     Types: Cigarettes   • Smokeless tobacco: Never Used   Substance and Sexual Activity   • Alcohol use: No   • Drug use: No   • Sexual activity: Defer     ALLERGIES:    No Known Allergies  MEDICATIONS:      Current Outpatient " Medications:   •  acetaminophen (TYLENOL) 500 MG tablet, Take 650 mg by mouth Every 6 (Six) Hours As Needed for Mild Pain ., Disp: , Rfl:   •  adalimumab (HUMIRA) 40 MG/0.8ML Prefilled Syringe Kit injection, Inject 40 mg under the skin into the appropriate area as directed Every 14 (Fourteen) Days., Disp: , Rfl:   •  albuterol sulfate  (90 Base) MCG/ACT inhaler, Inhale 2 puffs Every 4 (Four) Hours As Needed for Wheezing., Disp: , Rfl:   •  amLODIPine-valsartan (EXFORGE) 5-320 MG per tablet, Take 1 tablet by mouth Daily., Disp: 90 tablet, Rfl: 4  •  aspirin 81 MG EC tablet, Take 81 mg by mouth Every Night., Disp: , Rfl:   •  Multiple Vitamins-Minerals (SENTRY SENIOR PO), Take  by mouth Daily., Disp: , Rfl:   •  rosuvastatin (CRESTOR) 40 MG tablet, Take 1 tablet by mouth Daily., Disp: 90 tablet, Rfl: 4  •  Fluticasone-Umeclidin-Vilant (Trelegy Ellipta) 100-62.5-25 MCG/INH inhaler, Inhale 1 puff Daily., Disp: 1 each, Rfl: 11  ROS:  Per HPI, otherwise all systems reviewed and negative.    DIAGNOSTIC DATA (Reviewed and interpreted by me unless otherwise specified):    CT Chest 11/2021 & 12/2019 (both at ) -  emphysema, scattered granulomas, calcified pleural plaques, and RUL cavity which has remained stable    Vitals:    02/08/22 1211   BP: (!) 154/102   Pulse: 78   Resp: 16   Temp: 98.9 °F (37.2 °C)   SpO2: 96%       Physical Exam   Constitutional: Oriented to person, place, and time. Appears well-developed and well-nourished.   Head: Normocephalic and atraumatic.   Nose: Nose normal.   Mouth/Throat: Oropharynx is clear and moist.   Eyes: Conjunctivae are normal.  Pupils normal.  Neck: No tracheal deviation present.   Cardiovascular: Normal rate, regular rhythm, normal heart sounds and intact distal pulses.  Exam reveals no gallop and no friction rub.  No thrill.  No JVD.  No edema.  No murmur heard.  Pulmonary/Chest: Effort normal and breath sounds with prolonged expiratory phase.  No tenderness to palpation.   No clubbing.   Abdominal: Soft. Bowel sounds are normal. No distension. No tenderness. There is no guarding.   Musculoskeletal: Normal range of motion.  No tenderness.  Lymphadenopathy:  No cervical adenopathy.   Neurological:  No new focal neurological deficits observed   Skin: Skin is warm and dry. No rash noted.   Psychiatric: Normal mood and affect.  Behavior is normal. Judgment normal.    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  Continue albuterol prn.  Counseled patient on smoking cessation, would recommend NRT with gradual reduction until able to quit.  Chantix worked in past, but doesn't want it right now.  He does qualify for lung cancer screening - due November 2022.  Plan to re-evaluate then with LDCT, Full PFT, and 6MW.    RTC Nov 2022 w/ LDCT, Full PFT, and 6MW    Gavin Turner MD  Pulmonology and Critical Care Medicine  02/08/22 12:55 EST  Electronically Signed    C.C.:  Anthony Riley III, MD, Renny Ardon MD

## 2022-02-24 ENCOUNTER — TELEPHONE (OUTPATIENT)
Dept: GASTROENTEROLOGY | Facility: CLINIC | Age: 72
End: 2022-02-24

## 2022-02-24 ENCOUNTER — OFFICE VISIT (OUTPATIENT)
Dept: GASTROENTEROLOGY | Facility: CLINIC | Age: 72
End: 2022-02-24

## 2022-02-24 VITALS
WEIGHT: 175.2 LBS | OXYGEN SATURATION: 98 % | BODY MASS INDEX: 23.22 KG/M2 | HEIGHT: 73 IN | SYSTOLIC BLOOD PRESSURE: 142 MMHG | HEART RATE: 78 BPM | TEMPERATURE: 97.5 F | DIASTOLIC BLOOD PRESSURE: 76 MMHG

## 2022-02-24 DIAGNOSIS — R10.13 DYSPEPSIA: ICD-10-CM

## 2022-02-24 DIAGNOSIS — Z72.0 TOBACCO USE: ICD-10-CM

## 2022-02-24 DIAGNOSIS — R10.13 EPIGASTRIC PAIN: ICD-10-CM

## 2022-02-24 DIAGNOSIS — R11.0 NAUSEA: ICD-10-CM

## 2022-02-24 DIAGNOSIS — I25.10 CORONARY ARTERY DISEASE INVOLVING NATIVE CORONARY ARTERY OF NATIVE HEART WITHOUT ANGINA PECTORIS: ICD-10-CM

## 2022-02-24 DIAGNOSIS — K21.9 GASTROESOPHAGEAL REFLUX DISEASE, UNSPECIFIED WHETHER ESOPHAGITIS PRESENT: Primary | ICD-10-CM

## 2022-02-24 PROBLEM — H91.90 HOH (HARD OF HEARING): Status: ACTIVE | Noted: 2021-08-23

## 2022-02-24 PROBLEM — M05.79 RHEUMATOID ARTHRITIS INVOLVING MULTIPLE SITES WITH POSITIVE RHEUMATOID FACTOR (HCC): Status: ACTIVE | Noted: 2021-08-23

## 2022-02-24 PROBLEM — M54.2 NECK PAIN: Status: ACTIVE | Noted: 2021-08-23

## 2022-02-24 PROBLEM — J44.9 COPD (CHRONIC OBSTRUCTIVE PULMONARY DISEASE) (HCC): Status: ACTIVE | Noted: 2021-08-23

## 2022-02-24 PROCEDURE — 99204 OFFICE O/P NEW MOD 45 MIN: CPT | Performed by: INTERNAL MEDICINE

## 2022-02-24 NOTE — PROGRESS NOTES
Patient Name: Boo White  YOB: 1950   Medical Record number: [id number]     PCP: Renny Ardon MD    Chief Complaint   Patient presents with   • Heartburn       History of Present Illness:   HPI  Appreciate the consult for burning in the stomach and heartburn.  Mr. White is a 71-year-old with a history of coronary disease, hyperlipidemia and chronic obstructive pulmonary disease.  The patient continues to smoke about 1 pack daily.  He states that for years he has experienced  burning in the stomach that is associated with belching and bloating.  The patient does admit to occasional nausea without vomiting.  There is no history of difficult or painful swallowing.  Mr. White will take Tums on a nightly basis for burning in the chest.  The patient admits to upper center abdominal discomfort without radiation.  There is no history of change in bowel habits or signs of GI bleeding.  Mr. White denies any unexplained weight loss.  There is no history of night sweats, fever or chills.  He denies any family history of gastrointestinal cancer.  The patient has received the COVID-19 vaccine and booster shot.  He had a colonoscopy a couple years ago and a polyp was removed.  Past Medical History:   Diagnosis Date   • Arthritis    • Benign hypertension    • CAD (coronary artery disease)    • Colon polyp    • Dyspnea    • Family history of heart disease    • GERD (gastroesophageal reflux disease)    • Hypercholesterolemia    • Tobacco abuse      2 packs per day       Past Surgical History:   Procedure Laterality Date   • CARDIAC CATHETERIZATION     • CARDIAC CATHETERIZATION N/A 11/18/2016    Procedure: Left Heart Cath;  Surgeon: Derrick Ontiveros MD;  Location: [facility] INVASIVE LOC[facility];  Service:    • COLONOSCOPY     • CORONARY STENT PLACEMENT     • FINGER SURGERY      Right index finger removed secondary to working accident         Current Outpatient Medications:   •  acetaminophen (TYLENOL) 500 MG tablet,  Take 650 mg by mouth Every 6 (Six) Hours As Needed for Mild Pain ., Disp: , Rfl:   •  adalimumab (HUMIRA) 40 MG/0.8ML Prefilled Syringe Kit injection, Inject 40 mg under the skin into the appropriate area as directed Every 14 (Fourteen) Days., Disp: , Rfl:   •  albuterol sulfate  (90 Base) MCG/ACT inhaler, Inhale 2 puffs Every 4 (Four) Hours As Needed for Wheezing., Disp: , Rfl:   •  amLODIPine-valsartan (EXFORGE) 5-320 MG per tablet, Take 1 tablet by mouth Daily., Disp: 90 tablet, Rfl: 4  •  aspirin 81 MG EC tablet, Take 81 mg by mouth Every Night., Disp: , Rfl:   •  Fluticasone-Umeclidin-Vilant (Trelegy Ellipta) 100-62.5-25 MCG/INH inhaler, Inhale 1 puff Daily., Disp: 1 each, Rfl: 11  •  Multiple Vitamins-Minerals (SENTRY SENIOR PO), Take  by mouth Daily., Disp: , Rfl:   •  rosuvastatin (CRESTOR) 40 MG tablet, Take 1 tablet by mouth Daily., Disp: 90 tablet, Rfl: 4    No Known Allergies    Family History   Problem Relation Age of Onset   • No Known Problems Mother    • No Known Problems Father    • Colon cancer Neg Hx    • Colon polyps Neg Hx    • Esophageal cancer Neg Hx        Social History     Socioeconomic History   • Marital status:    Tobacco Use   • Smoking status: Current Every Day Smoker     Packs/day: 1.00     Years: 45.00     Pack years: 45.00     Types: Cigarettes   • Smokeless tobacco: Never Used   Vaping Use   • Vaping Use: Never used   Substance and Sexual Activity   • Alcohol use: No   • Drug use: No   • Sexual activity: Defer       Review of Systems   Constitutional: Negative for appetite change, fatigue, fever and unexpected weight change.   HENT: Negative for dental problem, mouth sores, postnasal drip, sneezing, trouble swallowing and voice change.    Eyes: Negative for pain, redness and itching.   Respiratory: Negative for cough, shortness of breath and wheezing.    Cardiovascular: Negative for chest pain, palpitations and leg swelling.   Gastrointestinal: Positive for abdominal  pain and nausea. Negative for abdominal distention, anal bleeding, blood in stool, constipation, diarrhea, rectal pain and vomiting.        Reflux   Endocrine: Negative for cold intolerance, heat intolerance, polydipsia and polyuria.   Genitourinary: Negative for dysuria, enuresis, flank pain, hematuria and urgency.   Musculoskeletal: Negative for arthralgias, back pain, joint swelling and myalgias.   Skin: Negative for color change, pallor and rash.   Allergic/Immunologic: Negative for environmental allergies, food allergies and immunocompromised state.   Neurological: Negative for dizziness, tremors, seizures, facial asymmetry, numbness and headaches.   Psychiatric/Behavioral: Negative for behavioral problems, dysphoric mood, hallucinations and self-injury.   All other systems reviewed and are negative.      Vitals:    02/24/22 1204   BP: 142/76   Pulse: 78   Temp: 97.5 °F (36.4 °C)   SpO2: 98%       Physical Exam  Vitals reviewed.   Constitutional:       General: He is not in acute distress.     Appearance: Normal appearance.   HENT:      Head: Normocephalic and atraumatic.      Nose: Nose normal.      Mouth/Throat:      Mouth: Mucous membranes are moist.      Pharynx: Oropharynx is clear.   Eyes:      General: No scleral icterus.     Extraocular Movements: Extraocular movements intact.   Cardiovascular:      Rate and Rhythm: Normal rate and regular rhythm.      Heart sounds: No murmur heard.      Pulmonary:      Breath sounds: Normal breath sounds. No wheezing or rales.   Abdominal:      General: Bowel sounds are normal.      Palpations: Abdomen is soft.      Tenderness: There is abdominal tenderness (EPIGASTRIUM). There is no guarding.   Musculoskeletal:         General: No tenderness.      Cervical back: Normal range of motion and neck supple.      Comments: Enlarged MCP joints, missing right second digit   Skin:     General: Skin is dry.      Coloration: Skin is not jaundiced.   Neurological:      Mental  Status: He is alert and oriented to person, place, and time.   Psychiatric:         Mood and Affect: Mood normal.         Thought Content: Thought content normal.         Judgment: Judgment normal.         Diagnoses and all orders for this visit:    1. Gastroesophageal reflux disease, unspecified whether esophagitis present (Primary)  -     Case Request; Standing  -     Case Request    2. Epigastric pain  -     Case Request; Standing  -     Case Request    3. Dyspepsia  -     Case Request; Standing  -     Case Request    4. Nausea  -     Case Request; Standing  -     Case Request    5. Coronary artery disease involving native coronary artery of native heart without angina pectoris    6. Tobacco use    The patient does have a history consistent with GERD.  There is a need to evaluate with endoscopy to rule out Rea's esophagus.  He is at increased risk for Rea's esophagus.  The differential diagnosis also includes peptic ulcer disease, H. pylori gastropathy and celiac sprue.      Plan: Will proceed with the EGD in the hospital endoscopy unit given the patient's history of COPD.           Encouraged tobacco cessation.           Further recommendations pending endoscopy.

## 2022-02-24 NOTE — TELEPHONE ENCOUNTER
PATIENT IS SCHEDULED FOR EGD FOR MARCH 8TH... PATIENT SEES DR LUCIA. PLEASE OBTAIN CARDIAC CLEARANCE.

## 2022-03-07 ENCOUNTER — ANESTHESIA EVENT (OUTPATIENT)
Dept: GASTROENTEROLOGY | Facility: HOSPITAL | Age: 72
End: 2022-03-07

## 2022-03-07 RX ORDER — SODIUM CHLORIDE 0.9 % (FLUSH) 0.9 %
10 SYRINGE (ML) INJECTION EVERY 12 HOURS SCHEDULED
Status: CANCELLED | OUTPATIENT
Start: 2022-03-07

## 2022-03-07 RX ORDER — LIDOCAINE HYDROCHLORIDE 10 MG/ML
0.5 INJECTION, SOLUTION EPIDURAL; INFILTRATION; INTRACAUDAL; PERINEURAL ONCE AS NEEDED
Status: CANCELLED | OUTPATIENT
Start: 2022-03-07

## 2022-03-07 RX ORDER — FAMOTIDINE 20 MG/1
20 TABLET, FILM COATED ORAL ONCE
Status: CANCELLED | OUTPATIENT
Start: 2022-03-07 | End: 2022-03-07

## 2022-03-08 ENCOUNTER — ANESTHESIA (OUTPATIENT)
Dept: GASTROENTEROLOGY | Facility: HOSPITAL | Age: 72
End: 2022-03-08

## 2022-03-08 ENCOUNTER — HOSPITAL ENCOUNTER (OUTPATIENT)
Facility: HOSPITAL | Age: 72
Setting detail: HOSPITAL OUTPATIENT SURGERY
Discharge: HOME | End: 2022-03-08
Attending: INTERNAL MEDICINE | Admitting: INTERNAL MEDICINE

## 2022-03-08 VITALS
OXYGEN SATURATION: 96 % | SYSTOLIC BLOOD PRESSURE: 105 MMHG | TEMPERATURE: 97.5 F | HEART RATE: 69 BPM | RESPIRATION RATE: 18 BRPM | DIASTOLIC BLOOD PRESSURE: 67 MMHG

## 2022-03-08 DIAGNOSIS — R10.13 DYSPEPSIA: ICD-10-CM

## 2022-03-08 DIAGNOSIS — R11.0 NAUSEA: ICD-10-CM

## 2022-03-08 DIAGNOSIS — R10.13 EPIGASTRIC PAIN: ICD-10-CM

## 2022-03-08 DIAGNOSIS — K21.9 GASTROESOPHAGEAL REFLUX DISEASE, UNSPECIFIED WHETHER ESOPHAGITIS PRESENT: ICD-10-CM

## 2022-03-08 PROCEDURE — 88305 TISSUE EXAM BY PATHOLOGIST: CPT | Performed by: INTERNAL MEDICINE

## 2022-03-08 PROCEDURE — 25010000002 PROPOFOL 10 MG/ML EMULSION: Performed by: NURSE ANESTHETIST, CERTIFIED REGISTERED

## 2022-03-08 PROCEDURE — 43239 EGD BIOPSY SINGLE/MULTIPLE: CPT | Performed by: INTERNAL MEDICINE

## 2022-03-08 PROCEDURE — 0 LIDOCAINE 1 % SOLUTION: Performed by: NURSE ANESTHETIST, CERTIFIED REGISTERED

## 2022-03-08 RX ORDER — PROPOFOL 10 MG/ML
VIAL (ML) INTRAVENOUS AS NEEDED
Status: DISCONTINUED | OUTPATIENT
Start: 2022-03-08 | End: 2022-03-08 | Stop reason: SURG

## 2022-03-08 RX ORDER — SODIUM CHLORIDE 0.9 % (FLUSH) 0.9 %
10 SYRINGE (ML) INJECTION AS NEEDED
Status: DISCONTINUED | OUTPATIENT
Start: 2022-03-08 | End: 2022-03-08 | Stop reason: HOSPADM

## 2022-03-08 RX ORDER — MIDAZOLAM HYDROCHLORIDE 1 MG/ML
0.5 INJECTION INTRAMUSCULAR; INTRAVENOUS
Status: DISCONTINUED | OUTPATIENT
Start: 2022-03-08 | End: 2022-03-08 | Stop reason: HOSPADM

## 2022-03-08 RX ORDER — LIDOCAINE HYDROCHLORIDE 10 MG/ML
INJECTION, SOLUTION INFILTRATION; PERINEURAL AS NEEDED
Status: DISCONTINUED | OUTPATIENT
Start: 2022-03-08 | End: 2022-03-08 | Stop reason: SURG

## 2022-03-08 RX ORDER — PROPOFOL 10 MG/ML
VIAL (ML) INTRAVENOUS CONTINUOUS PRN
Status: DISCONTINUED | OUTPATIENT
Start: 2022-03-08 | End: 2022-03-08 | Stop reason: SURG

## 2022-03-08 RX ORDER — FAMOTIDINE 10 MG/ML
20 INJECTION, SOLUTION INTRAVENOUS ONCE
Status: COMPLETED | OUTPATIENT
Start: 2022-03-08 | End: 2022-03-08

## 2022-03-08 RX ORDER — SODIUM CHLORIDE, SODIUM LACTATE, POTASSIUM CHLORIDE, CALCIUM CHLORIDE 600; 310; 30; 20 MG/100ML; MG/100ML; MG/100ML; MG/100ML
9 INJECTION, SOLUTION INTRAVENOUS CONTINUOUS
Status: DISCONTINUED | OUTPATIENT
Start: 2022-03-08 | End: 2022-03-08 | Stop reason: HOSPADM

## 2022-03-08 RX ORDER — ONDANSETRON 2 MG/ML
4 INJECTION INTRAMUSCULAR; INTRAVENOUS ONCE AS NEEDED
Status: DISCONTINUED | OUTPATIENT
Start: 2022-03-08 | End: 2022-03-08 | Stop reason: HOSPADM

## 2022-03-08 RX ADMIN — FAMOTIDINE 20 MG: 10 INJECTION INTRAVENOUS at 12:50

## 2022-03-08 RX ADMIN — LIDOCAINE HYDROCHLORIDE 50 MG: 10 INJECTION, SOLUTION INFILTRATION; PERINEURAL at 13:30

## 2022-03-08 RX ADMIN — Medication 100 MCG/KG/MIN: at 13:30

## 2022-03-08 RX ADMIN — PROPOFOL 100 MG: 10 INJECTION, EMULSION INTRAVENOUS at 13:30

## 2022-03-08 RX ADMIN — Medication 10 ML: at 12:49

## 2022-03-08 RX ADMIN — SODIUM CHLORIDE, POTASSIUM CHLORIDE, SODIUM LACTATE AND CALCIUM CHLORIDE 9 ML/HR: 600; 310; 30; 20 INJECTION, SOLUTION INTRAVENOUS at 12:49

## 2022-03-08 NOTE — ANESTHESIA POSTPROCEDURE EVALUATION
Patient: Boo White    Procedure Summary     Date: 03/08/22 Room / Location:  [facility] /  [facility]    Anesthesia Start: 1326 Anesthesia Stop: 1343    Procedure: ESOPHAGOGASTRODUODENOSCOPY (N/A ) Diagnosis:       Gastroesophageal reflux disease, unspecified whether esophagitis present      Epigastric pain      Dyspepsia      Nausea      (Gastroesophageal reflux disease, unspecified whether esophagitis present [K21.9])      (Epigastric pain [R10.13])      (Dyspepsia [R10.13])      (Nausea [R11.0])    Surgeons: Edi Read MD Provider: Ayden Herrera MD    Anesthesia Type: general, MAC ASA Status: 3          Anesthesia Type: general, MAC    Vitals  No vitals data found for the desired time range.          Post Anesthesia Care and Evaluation    Patient location during evaluation: PACU  Patient participation: complete - patient participated  Level of consciousness: awake and alert  Pain score: 0  Pain management: adequate  Airway patency: patent  Anesthetic complications: No anesthetic complications  PONV Status: none  Cardiovascular status: hemodynamically stable and acceptable  Respiratory status: nonlabored ventilation, acceptable and nasal cannula  Hydration status: acceptable

## 2022-03-08 NOTE — ANESTHESIA PREPROCEDURE EVALUATION
" Anesthesia Evaluation     Patient summary reviewed and Nursing notes reviewed   NPO Solid Status: > 8 hours  NPO Liquid Status: > 8 hours           Airway   Mallampati: II  TM distance: >3 FB  Neck ROM: limited  No difficulty expected  Dental      Pulmonary    (+) a smoker, COPD (albuterol) moderate, shortness of breath,   (-) recent URI, sleep apnea  Cardiovascular     ECG reviewed    (+) hypertension, CAD, cardiac stents (2012 ) hyperlipidemia,   (-) dysrhythmias, angina    ROS comment: ECG NSR inc LBBB NS ST TW changes  LVH    CATH 2016 EF 63% NO  CAD  NO LV dysfxn as suggested in nuclear perfusion study.     SB Cards Dr Riley Jan 2022  \"stable CAD\"     Neuro/Psych  (-) seizures, CVA  GI/Hepatic/Renal/Endo    (+)  GERD,    (-) no renal disease (creat <1 ), diabetes (denies A1C 5.2   )    Musculoskeletal     (+) neck pain,   Abdominal    Substance History      OB/GYN          Other   arthritis (Rheumatoid),      ROS/Med Hx Other: Dyspepsia and nausea  Labs pending                 Anesthesia Plan    ASA 3     general and MAC   (PFL)  intravenous induction     Anesthetic plan, all risks, benefits, and alternatives have been provided, discussed and informed consent has been obtained with: patient.    Plan discussed with CRNA.        CODE STATUS:       "

## 2022-03-09 LAB
CYTO UR: NORMAL
LAB AP CASE REPORT: NORMAL
LAB AP CLINICAL INFORMATION: NORMAL
LAB AP DIAGNOSIS COMMENT: NORMAL
PATH REPORT.FINAL DX SPEC: NORMAL
PATH REPORT.GROSS SPEC: NORMAL

## 2022-03-10 ENCOUNTER — TELEPHONE (OUTPATIENT)
Dept: GASTROENTEROLOGY | Facility: CLINIC | Age: 72
End: 2022-03-10

## 2022-03-10 DIAGNOSIS — K21.00 GASTROESOPHAGEAL REFLUX DISEASE WITH ESOPHAGITIS WITHOUT HEMORRHAGE: Primary | ICD-10-CM

## 2022-03-10 RX ORDER — ESOMEPRAZOLE MAGNESIUM 40 MG/1
40 CAPSULE, DELAYED RELEASE ORAL DAILY
Qty: 30 CAPSULE | Refills: 5 | Status: SHIPPED | OUTPATIENT
Start: 2022-03-10 | End: 2022-08-02 | Stop reason: SDUPTHER

## 2022-08-02 ENCOUNTER — OFFICE VISIT (OUTPATIENT)
Dept: GASTROENTEROLOGY | Facility: CLINIC | Age: 72
End: 2022-08-02

## 2022-08-02 VITALS
BODY MASS INDEX: 23.99 KG/M2 | WEIGHT: 181 LBS | SYSTOLIC BLOOD PRESSURE: 142 MMHG | TEMPERATURE: 97.3 F | OXYGEN SATURATION: 94 % | HEIGHT: 73 IN | HEART RATE: 65 BPM | DIASTOLIC BLOOD PRESSURE: 80 MMHG

## 2022-08-02 DIAGNOSIS — K21.00 GASTROESOPHAGEAL REFLUX DISEASE WITH ESOPHAGITIS WITHOUT HEMORRHAGE: Primary | ICD-10-CM

## 2022-08-02 DIAGNOSIS — Z72.0 TOBACCO USE: ICD-10-CM

## 2022-08-02 PROCEDURE — 99213 OFFICE O/P EST LOW 20 MIN: CPT | Performed by: NURSE PRACTITIONER

## 2022-08-02 RX ORDER — SARILUMAB 200 MG/1.14ML
1 INJECTION, SOLUTION SUBCUTANEOUS
COMMUNITY

## 2022-08-02 RX ORDER — ESOMEPRAZOLE MAGNESIUM 40 MG/1
40 CAPSULE, DELAYED RELEASE ORAL DAILY
Qty: 30 CAPSULE | Refills: 11 | Status: SHIPPED | OUTPATIENT
Start: 2022-08-02

## 2022-08-02 NOTE — PROGRESS NOTES
Follow Up      Patient Name: Boo White  : 1950   MRN: [id number]     Chief Complaint:    Chief Complaint   Patient presents with   • Follow-up       History of Present Illness: Boo White is a 72 y.o. male who is here today for follow up on GERD.     Boo underwent upper endoscopy for heartburn in March.  He was started on Nexium once daily.  He was previously not on PPI.  Since being started on Nexium, he has had no breakthrough heartburn and has been doing well.  There is no dysphagia, nausea, epigastric pain.  His appetite is good.  He occasionally takes NSAIDS but not often. He does use tobacco. Denies alcohol use.   Does have RA and is on Biologics (Kevzara)    UPPER GI ENDOSCOPY (2022 13:19)-small hiatal hernia, LA grade B esophagitis, gastritis    Final Diagnosis   1. DUODENUM, BIOPSY:  Duodenal type mucosa with no significant histopathologic abnormalities  2.   STOMACH, ANTRUM, BIOPSY:  Abundant duodenal type tissue with no significant gastric mucosa present for evaluation, see comment  3.   STOMACH, BODY, BIOPSY:  Gastric fundic type mucosa with mild chronic inactive gastritis  Negative for intestinal metaplasia or dysplasia  No Helicobacter pylori like organisms seen  4.   DISTAL ESOPHAGUS, BIOPSY:  Glandular mucosa with abundant intestinal metaplasia, no dysplasia identified, see comment  Squamous mucosa with features consistent with reflux esophagitis  Negative for specific microorganisms  5.   ESOPHAGUS, MID, BIOPSY:  Squamous mucosa with features suggestive of reflux esophagitis  Negative for glandular type mucosa, intestinal metaplasia, or dysplasia  Negative for specific microorganisms               He is UTD with his colonoscopy  Subjective      Review of Systems:   Review of Systems   Constitutional: Negative for activity change, appetite change, chills, diaphoresis, fatigue, fever, unexpected weight gain and unexpected weight loss.   HENT: Negative for trouble  swallowing.    Gastrointestinal: Negative for abdominal distention, abdominal pain, anal bleeding, blood in stool, constipation, diarrhea, nausea, rectal pain, vomiting, GERD and indigestion.       Medications:     Current Outpatient Medications:   •  albuterol sulfate  (90 Base) MCG/ACT inhaler, Inhale 2 puffs Every 4 (Four) Hours As Needed for Wheezing., Disp: , Rfl:   •  amLODIPine-valsartan (EXFORGE) 5-320 MG per tablet, Take 1 tablet by mouth Daily., Disp: 90 tablet, Rfl: 4  •  aspirin 81 MG EC tablet, Take 81 mg by mouth Every Night., Disp: , Rfl:   •  esomeprazole (nexIUM) 40 MG capsule, Take 1 capsule by mouth Daily., Disp: 30 capsule, Rfl: 11  •  Fluticasone-Umeclidin-Vilant (Trelegy Ellipta) 100-62.5-25 MCG/INH inhaler, Inhale 1 puff Daily., Disp: 1 each, Rfl: 11  •  Multiple Vitamins-Minerals (SENTRY SENIOR PO), Take  by mouth Daily., Disp: , Rfl:   •  rosuvastatin (CRESTOR) 40 MG tablet, Take 1 tablet by mouth Daily., Disp: 90 tablet, Rfl: 4  •  Sarilumab (Kevzara) 200 MG/1.14ML solution prefilled syringe, Inject 1 pen under the skin into the appropriate area as directed Every 14 (Fourteen) Days., Disp: , Rfl:     Allergies:   No Known Allergies    Social History:   Social History     Socioeconomic History   • Marital status:    Tobacco Use   • Smoking status: Current Every Day Smoker     Packs/day: 1.00     Years: 45.00     Pack years: 45.00     Types: Cigarettes   • Smokeless tobacco: Never Used   Vaping Use   • Vaping Use: Never used   Substance and Sexual Activity   • Alcohol use: No   • Drug use: No   • Sexual activity: Defer        Surgical History:   Past Surgical History:   Procedure Laterality Date   • CARDIAC CATHETERIZATION     • CARDIAC CATHETERIZATION N/A 11/18/2016    Procedure: Left Heart Cath;  Surgeon: Derrick Ontiveros MD;  Location: [facility] INVASIVE LOCATION;  Service:    • COLONOSCOPY     • CORONARY STENT PLACEMENT     • ENDOSCOPY N/A 3/8/2022    Procedure:  "ESOPHAGOGASTRODUODENOSCOPY;  Surgeon: Edi Read MD;  Location: [facility] ENDOSCOPY;  Service: Gastroenterology;  Laterality: N/A;   • FINGER SURGERY      Right index finger removed secondary to working accident        Medical History:   Past Medical History:   Diagnosis Date   • Arthritis    • Benign hypertension    • CAD (coronary artery disease)     stents-2012   • Colon polyp    • COPD (chronic obstructive pulmonary disease) (HCC)    • Dyspnea    • Family history of heart disease    • Hypercholesterolemia    • Rheumatoid arthritis (HCC)    • Tobacco abuse      2 packs per day        Objective     Physical Exam:  Vital Signs:   Vitals:    08/02/22 1024   BP: 142/80   BP Location: Left arm   Patient Position: Sitting   Cuff Size: Adult   Pulse: 65   Temp: 97.3 °F (36.3 °C)   TempSrc: Temporal   SpO2: 94%   Weight: 82.1 kg (181 lb)   Height: 185.4 cm (72.99\")     Body mass index is 23.89 kg/m².     Physical Exam  Vitals and nursing note reviewed.   Constitutional:       General: He is not in acute distress.     Appearance: He is well-developed. He is not diaphoretic.   Eyes:      General: No scleral icterus.     Conjunctiva/sclera: Conjunctivae normal.   Neck:      Thyroid: No thyromegaly.   Cardiovascular:      Rate and Rhythm: Normal rate and regular rhythm.   Pulmonary:      Effort: Pulmonary effort is normal.      Breath sounds: Normal breath sounds.   Abdominal:      General: Bowel sounds are normal. There is no distension.      Palpations: Abdomen is soft.      Tenderness: There is no abdominal tenderness. There is no guarding or rebound.      Hernia: No hernia is present.   Musculoskeletal:      Cervical back: Neck supple.      Right lower leg: No edema.      Left lower leg: No edema.   Skin:     General: Skin is warm and dry.      Capillary Refill: Capillary refill takes 2 to 3 seconds.      Coloration: Skin is not jaundiced or pale.      Findings: No bruising or petechiae.      Nails: There is no " clubbing.   Neurological:      Mental Status: He is alert and oriented to person, place, and time.   Psychiatric:         Behavior: Behavior normal.         Thought Content: Thought content normal.         Judgment: Judgment normal.         Assessment / Plan      Assessment/Plan:   Diagnoses and all orders for this visit:    1. Gastroesophageal reflux disease with esophagitis without hemorrhage (Primary)  -     esomeprazole (nexIUM) 40 MG capsule; Take 1 capsule by mouth Daily.  Dispense: 30 capsule; Refill: 11  Based on pathology, recommend remaining on PPI unless becomes contraindicated.  We discussed possible long-term side effects of PPI.  Counseled on tobacco cessation and diet changes.  2. Tobacco use         Follow Up:   Return in about 1 year (around 8/2/2023), or if symptoms worsen or fail to improve.    Plan of care reviewed with the patient at the conclusion of today's visit.  Education was provided regarding diagnosis, management, and any prescribed or recommended OTC medications.  Patient verbalized understanding of and agreement with management plan.       MARLI Morgan  [facility]

## 2022-12-07 ENCOUNTER — OFFICE VISIT (OUTPATIENT)
Dept: PULMONOLOGY | Facility: CLINIC | Age: 72
End: 2022-12-07

## 2022-12-07 VITALS
OXYGEN SATURATION: 97 % | WEIGHT: 182 LBS | SYSTOLIC BLOOD PRESSURE: 128 MMHG | RESPIRATION RATE: 18 BRPM | BODY MASS INDEX: 24.12 KG/M2 | DIASTOLIC BLOOD PRESSURE: 78 MMHG | TEMPERATURE: 97.1 F | HEART RATE: 71 BPM | HEIGHT: 73 IN

## 2022-12-07 DIAGNOSIS — C34.91 NON-SMALL CELL CANCER OF RIGHT LUNG: Primary | ICD-10-CM

## 2022-12-07 DIAGNOSIS — Z00.6 EXAMINATION FOR NORMAL COMPARISON OR CONTROL IN CLINICAL RESEARCH: Primary | ICD-10-CM

## 2022-12-07 DIAGNOSIS — C34.11 MALIGNANT NEOPLASM OF UPPER LOBE, RIGHT BRONCHUS OR LUNG: ICD-10-CM

## 2022-12-07 DIAGNOSIS — Z23 IMMUNIZATION DUE: ICD-10-CM

## 2022-12-07 PROCEDURE — 94060 EVALUATION OF WHEEZING: CPT | Performed by: INTERNAL MEDICINE

## 2022-12-07 PROCEDURE — 94726 PLETHYSMOGRAPHY LUNG VOLUMES: CPT | Performed by: INTERNAL MEDICINE

## 2022-12-07 PROCEDURE — G0008 ADMIN INFLUENZA VIRUS VAC: HCPCS | Performed by: INTERNAL MEDICINE

## 2022-12-07 PROCEDURE — 94618 PULMONARY STRESS TESTING: CPT | Performed by: INTERNAL MEDICINE

## 2022-12-07 PROCEDURE — 94729 DIFFUSING CAPACITY: CPT | Performed by: INTERNAL MEDICINE

## 2022-12-07 PROCEDURE — 99214 OFFICE O/P EST MOD 30 MIN: CPT | Performed by: INTERNAL MEDICINE

## 2022-12-07 PROCEDURE — 90662 IIV NO PRSV INCREASED AG IM: CPT | Performed by: INTERNAL MEDICINE

## 2022-12-07 RX ORDER — ALBUTEROL SULFATE 90 UG/1
4 AEROSOL, METERED RESPIRATORY (INHALATION) ONCE
Status: COMPLETED | OUTPATIENT
Start: 2022-12-07 | End: 2022-12-07

## 2022-12-07 RX ORDER — ALBUTEROL SULFATE 90 UG/1
2 AEROSOL, METERED RESPIRATORY (INHALATION) EVERY 4 HOURS PRN
Qty: 18 G | Refills: 11 | Status: SHIPPED | OUTPATIENT
Start: 2022-12-07

## 2022-12-07 RX ADMIN — ALBUTEROL SULFATE 4 PUFF: 90 AEROSOL, METERED RESPIRATORY (INHALATION) at 13:49

## 2022-12-07 NOTE — PROGRESS NOTES
"CC:    Shortness of breath    HPI:    72 y.o. WM w/ h/o extensive tobacco abuse ~100 py+ - started age 15 - max 3ppd - currently 1 ppd, COPD, HTN, HLD, CAD s/p stents, RA on Humira for ~2 years, who is referred today for evaluation of dyspnea and COPD.  Patient does note PRESCOTT, particularly worse with inclines, carrying things, and for longer distances on flat surface.  Prescribed symbicort.  Hardly ever uses albuterol.  Does have cough, worse in mornings.  No hemoptysis.  Follows with Rheum at .  Had negative quantiferon in 2018.  Prior CT scans from 11/2021 and 12/2019 show emphysema, scattered granulomas, calcified pleural plaques, and RUL cavity which has remained stable.   PFT at  read as \"severe obstruction\", actual numbers / report pending.    INTERVAL HISTORY:    72 y.o. patient returns today for follow up.  Breathing a little worse but ran out of Trelegy (and prescription drug coverage) until end of year.    PMH:    Past Medical History:   Diagnosis Date   • Arthritis    • Benign hypertension    • CAD (coronary artery disease)     stents-2012   • Colon polyp    • COPD (chronic obstructive pulmonary disease) (HCC)    • Dyspnea    • Family history of heart disease    • Hypercholesterolemia    • Rheumatoid arthritis (HCC)    • Tobacco abuse      2 packs per day     PSH:    Past Surgical History:   Procedure Laterality Date   • CARDIAC CATHETERIZATION     • CARDIAC CATHETERIZATION N/A 11/18/2016    Procedure: Left Heart Cath;  Surgeon: Derrick Ontiveros MD;  Location:  [facility] CATH INVASIVE LOCATION;  Service:    • COLONOSCOPY     • CORONARY STENT PLACEMENT     • ENDOSCOPY N/A 3/8/2022    Procedure: ESOPHAGOGASTRODUODENOSCOPY;  Surgeon: Edi Read MD;  Location:  [facility] ENDOSCOPY;  Service: Gastroenterology;  Laterality: N/A;   • FINGER SURGERY      Right index finger removed secondary to working accident     FH:    Family History   Problem Relation Age of Onset   • No Known Problems Mother    • No " Known Problems Father    • Colon cancer Neg Hx    • Colon polyps Neg Hx    • Esophageal cancer Neg Hx      SH:    Social History     Socioeconomic History   • Marital status:    Tobacco Use   • Smoking status: Every Day     Packs/day: 1.00     Years: 45.00     Pack years: 45.00     Types: Cigarettes   • Smokeless tobacco: Never   Vaping Use   • Vaping Use: Never used   Substance and Sexual Activity   • Alcohol use: No   • Drug use: No   • Sexual activity: Defer     ALLERGIES:    No Known Allergies  MEDICATIONS:      Current Outpatient Medications:   •  albuterol sulfate  (90 Base) MCG/ACT inhaler, Inhale 2 puffs Every 4 (Four) Hours As Needed for Wheezing., Disp: , Rfl:   •  amLODIPine-valsartan (EXFORGE) 5-320 MG per tablet, Take 1 tablet by mouth Daily., Disp: 90 tablet, Rfl: 4  •  aspirin 81 MG EC tablet, Take 81 mg by mouth Every Night., Disp: , Rfl:   •  esomeprazole (nexIUM) 40 MG capsule, Take 1 capsule by mouth Daily., Disp: 30 capsule, Rfl: 11  •  Fluticasone-Umeclidin-Vilant (Trelegy Ellipta) 100-62.5-25 MCG/INH inhaler, Inhale 1 puff Daily., Disp: 1 each, Rfl: 11  •  Multiple Vitamins-Minerals (SENTRY SENIOR PO), Take  by mouth Daily., Disp: , Rfl:   •  rosuvastatin (CRESTOR) 40 MG tablet, Take 1 tablet by mouth Daily., Disp: 90 tablet, Rfl: 4  •  Sarilumab (Kevzara) 200 MG/1.14ML solution prefilled syringe, Inject 1 pen under the skin into the appropriate area as directed Every 14 (Fourteen) Days., Disp: , Rfl:   ROS:  Per HPI    DIAGNOSTIC DATA (Reviewed and interpreted by me unless otherwise specified):    CT Chest 11/2021 & 12/2019 (both at ) -  emphysema, scattered granulomas, calcified pleural plaques, and RUL cavity which has remained stable    CT Chest 11/9/22 - compared to 11/2021 stable emphysema, scattered granulomas, calcified pleural plaques, however, RUL apical cavity appears to have a thicker wall    PFT 12/7/22 - moderately severe obstruction, +BD change, no restriction,  +air trapping, mod dec DLCO    6MW 12/7/22 - starting sat 97% on RA, min sat 90% on RA (mostly 94%), walked 350 meters, 58% predicted    Vitals:    12/07/22 1200   BP: 128/78   Pulse: 71   Resp: 18   Temp: 97.1 °F (36.2 °C)   SpO2: 97%       Physical Exam:    Constitutional: Alert, no Distress  Mouth/Throat: Oropharynx is clear and moist.   Cardiovascular: Normal rate, regular rhythm, normal heart sounds.   Pulmonary/Chest: Effort normal and breath sounds normal.  No clubbing.       Assessment & Plan     1)  COPD GOLD 2A  2)  Tobacco Abuse ~ 100 py+ - currently 1 ppd  3)  RUL Cavity - stable from 12/2019 - 11/2021 - quantiferon neg 2018, thicker wall 11/2022  4)  Benign Asbestos Related Pleural Plaques - carpentry  5)  RA on Sarilumab    Continue Trelegy 100 once prescription drug coverage resumes in January.  For now he was given Stiolto samples.  Continue albuterol prn.  Counseled patient on smoking cessation, would recommend NRT with gradual reduction until able to quit.  Chantix worked in past, but doesn't want it right now.      Patient enrolled in Lung CA screening.  Repeat LDCT Nov 2022 shows thicker wall to RUL cavity.  Possible this could be a cavitating squamous cell lung cancer.  Will get PET-CT.  Opportunistic infection also possible - on IL-6 inhibitor for RA.  Will bring him back in 3-4 weeks to go over results.    RTC 1 month to go over PET CT and plan    Gavin Turner MD  Pulmonology and Critical Care Medicine  12/07/22 12:45 EST  Electronically Signed    C.C.:  No ref. provider found, Renny Ardon MD

## 2022-12-09 DIAGNOSIS — F17.210 NICOTINE DEPENDENCE, CIGARETTES, UNCOMPLICATED: ICD-10-CM

## 2022-12-13 ENCOUNTER — TELEPHONE (OUTPATIENT)
Dept: PULMONOLOGY | Facility: CLINIC | Age: 72
End: 2022-12-13

## 2023-01-04 ENCOUNTER — APPOINTMENT (OUTPATIENT)
Dept: PET IMAGING | Facility: HOSPITAL | Age: 73
End: 2023-01-04

## 2023-01-30 ENCOUNTER — OFFICE VISIT (OUTPATIENT)
Dept: CARDIOLOGY | Facility: CLINIC | Age: 73
End: 2023-01-30
Payer: MEDICARE

## 2023-01-30 VITALS
HEART RATE: 80 BPM | HEIGHT: 73 IN | WEIGHT: 186 LBS | DIASTOLIC BLOOD PRESSURE: 62 MMHG | OXYGEN SATURATION: 97 % | BODY MASS INDEX: 24.65 KG/M2 | SYSTOLIC BLOOD PRESSURE: 138 MMHG

## 2023-01-30 DIAGNOSIS — I25.10 CORONARY ARTERY DISEASE INVOLVING NATIVE CORONARY ARTERY OF NATIVE HEART WITHOUT ANGINA PECTORIS: Primary | ICD-10-CM

## 2023-01-30 DIAGNOSIS — I10 BENIGN HYPERTENSION: ICD-10-CM

## 2023-01-30 DIAGNOSIS — E78.00 HYPERCHOLESTEROLEMIA: ICD-10-CM

## 2023-01-30 PROCEDURE — 99214 OFFICE O/P EST MOD 30 MIN: CPT | Performed by: INTERNAL MEDICINE

## 2023-01-30 RX ORDER — AMLODIPINE AND VALSARTAN 5; 320 MG/1; MG/1
1 TABLET ORAL DAILY
Qty: 90 TABLET | Refills: 4 | Status: SHIPPED | OUTPATIENT
Start: 2023-01-30

## 2023-01-30 RX ORDER — ROSUVASTATIN CALCIUM 40 MG/1
40 TABLET, COATED ORAL DAILY
Qty: 90 TABLET | Refills: 4 | Status: SHIPPED | OUTPATIENT
Start: 2023-01-30

## 2023-01-30 NOTE — PROGRESS NOTES
[facility]  Office visit  Boo White  1950  [phone]  VISIT DATE:  1/30/2023      PCP: Renny Ardon MD  [address] [place] [id number]    CC:  Chief Complaint   Patient presents with   • Coronary Artery Disease   • Hypertension   • Hyperlipidemia       PROBLEM LIST:  1. Coronary artery disease with abnormal Cardiolite:  a. A 6-9 month history of progressive shortness of breath/dyspnea upon exertion.  b. Abnormal EKG revealing inferior ST downsloping.  c. Echocardiogram, 05/15/2012: Ejection fraction normal at 60%, mild diastolic dysfunction, trace mitral regurgitation, mild tricuspid regurgitation with an RVSP at 33.  d. Abnormal Lexiscan Cardiolite: The patient walked 3 minutes with abnormal imaging studies.  e. Left heart catheterization, 08/30/2012: Status post FFR and drug-eluting stent to the mid and proximal RCA using a 4.6 mm Promus stent proximally and 3 x 32 in the mid vessel and stenting of the left circumflex using a 2.75 x 16 mm Promus drug-eluting stent.  2. Benign hypertension  3. Hypercholesterolemia  4. Non-insulin-dependent type 2, diabetes mellitus  5. Ongoing tobacco abuse; 2 packs per day  6. Family history of heart disease  7. Surgical history:  a. Right index finger removed secondary to working accident     February 2021 ABIs: Normal    ASSESSMENT:   Diagnosis Plan   1. Coronary artery disease involving native coronary artery of native heart without angina pectoris        2. Benign hypertension        3. Hypercholesterolemia            PLAN:  Coronary artery disease: Currently stable and asymptomatic.  Continue aspirin, afterload reduction and statin therapy.    Hypertension: Goal less than 130/80 mmHg.  Well-controlled at home.  Continue combination of amlodipine and valsartan    Hyperlipidemia: Goal LDL less than 70.  Continue rosuvastatin 40 mg p.o. daily.  Lipid profile pending.    Nicotine abuse: Counseled on need for  "smoking cessation.  Previously attempted Chantix.  Currently smoking a pack per day, not ready to try quitting at this time.      Subjective  COPD.  Reports stable functional status.  Describes shortness of breath in a class III pattern with such activities as going up a flights of stairs or walking up an incline carrying something or taking out the trash.  Currently smoking a pack per day, previously smoked as much as 2 packs per day.  Blood pressures running less than 140/90 mmHg.  Denies chest discomfort and palpitations.  He appears compliant with medical therapy.  Recent abnormal screening lung CT, pet imaging pending.    PHYSICAL EXAMINATION:  Vitals:    01/30/23 1004   BP: 138/62   BP Location: Left arm   Patient Position: Sitting   Pulse: 80   SpO2: 97%   Weight: 84.4 kg (186 lb)   Height: 185.4 cm (72.99\")     General Appearance:    Alert, cooperative, no distress, appears stated age   Head:    Normocephalic, without obvious abnormality, atraumatic   Eyes:    conjunctiva/corneas clear   Nose:   Nares normal, septum midline, mucosa normal, no drainage   Throat:   Lips, teeth and gums normal   Neck:   Supple, symmetrical, trachea midline, no carotid    bruit or JVD   Lungs:     Diminished throughout, respirations unlabored   Chest Wall:    No tenderness or deformity    Heart:    Regular rate and rhythm, S1 and S2 normal, no murmur, rub   or gallop, normal carotid impulse bilaterally without bruit.   Abdomen:     Soft, non-tender   Extremities:   Extremities normal, atraumatic, no cyanosis or edema   Pulses:  Pedal pulses are difficult to appreciate   Skin:   Skin color, texture, turgor normal, no rashes or lesions       Diagnostic Data:  Procedures  Lab Results   Component Value Date    CHLPL 149 04/12/2022    TRIG 69 04/12/2022    HDL 47 04/12/2022     Lab Results   Component Value Date    GLUCOSE 121 (H) 01/25/2021    BUN 21 04/12/2022    CREATININE 0.93 04/12/2022     04/12/2022    K 4.6 04/12/2022 "     04/12/2022    CO2 24 04/12/2022     Lab Results   Component Value Date    HGBA1C 5.20 11/18/2016     Lab Results   Component Value Date    WBC 3.26 (L) 08/02/2022    HGB 14.4 08/02/2022    HCT 41.9 08/02/2022     08/02/2022       Allergies  No Known Allergies    Current Medications    Current Outpatient Medications:   •  albuterol sulfate  (90 Base) MCG/ACT inhaler, Inhale 2 puffs Every 4 (Four) Hours As Needed for Wheezing or Shortness of Air., Disp: 18 g, Rfl: 11  •  amLODIPine-valsartan (EXFORGE) 5-320 MG per tablet, Take 1 tablet by mouth Daily., Disp: 90 tablet, Rfl: 4  •  aspirin 81 MG EC tablet, Take 81 mg by mouth Every Night., Disp: , Rfl:   •  esomeprazole (nexIUM) 40 MG capsule, Take 1 capsule by mouth Daily., Disp: 30 capsule, Rfl: 11  •  Multiple Vitamins-Minerals (SENTRY SENIOR PO), Take  by mouth Daily., Disp: , Rfl:   •  rosuvastatin (CRESTOR) 40 MG tablet, Take 1 tablet by mouth Daily., Disp: 90 tablet, Rfl: 4  •  Sarilumab (Kevzara) 200 MG/1.14ML solution prefilled syringe, Inject 1 pen under the skin into the appropriate area as directed Every 14 (Fourteen) Days., Disp: , Rfl:           ROS  Review of Systems   Cardiovascular: Positive for dyspnea on exertion. Negative for chest pain, irregular heartbeat, near-syncope, palpitations and syncope.   Respiratory: Positive for cough, shortness of breath and wheezing. Negative for sputum production.    Neurological: Positive for dizziness.     SOCIAL HX  Social History     Socioeconomic History   • Marital status:    Tobacco Use   • Smoking status: Every Day     Packs/day: 1.00     Years: 45.00     Pack years: 45.00     Types: Cigarettes   • Smokeless tobacco: Never   Vaping Use   • Vaping Use: Never used   Substance and Sexual Activity   • Alcohol use: No   • Drug use: No   • Sexual activity: Defer       FAMILY HX  Family History   Problem Relation Age of Onset   • No Known Problems Mother    • No Known Problems Father     • Colon cancer Neg Hx    • Colon polyps Neg Hx    • Esophageal cancer Neg Hx              Anthony Riley III, MD, FACC

## 2023-02-01 ENCOUNTER — TRANSCRIBE ORDERS (OUTPATIENT)
Dept: PULMONOLOGY | Facility: CLINIC | Age: 73
End: 2023-02-01
Payer: MEDICARE

## 2023-02-15 ENCOUNTER — HOSPITAL ENCOUNTER (OUTPATIENT)
Dept: PET IMAGING | Facility: HOSPITAL | Age: 73
Discharge: HOME | End: 2023-02-15
Payer: MEDICARE

## 2023-02-15 DIAGNOSIS — C34.91 NON-SMALL CELL CANCER OF RIGHT LUNG: ICD-10-CM

## 2023-02-15 DIAGNOSIS — C34.11 MALIGNANT NEOPLASM OF UPPER LOBE, RIGHT BRONCHUS OR LUNG: ICD-10-CM

## 2023-02-15 LAB — GLUCOSE BLDC GLUCOMTR-MCNC: 103 MG/DL (ref 70–130)

## 2023-02-15 PROCEDURE — 78815 PET IMAGE W/CT SKULL-THIGH: CPT

## 2023-02-15 PROCEDURE — 0 FLUDEOXYGLUCOSE F18 SOLUTION: Performed by: INTERNAL MEDICINE

## 2023-02-15 PROCEDURE — 82962 GLUCOSE BLOOD TEST: CPT

## 2023-02-15 PROCEDURE — A9552 F18 FDG: HCPCS | Performed by: INTERNAL MEDICINE

## 2023-02-15 RX ADMIN — FLUDEOXYGLUCOSE F18 1 DOSE: 300 INJECTION INTRAVENOUS at 09:33

## 2023-02-27 ENCOUNTER — OFFICE VISIT (OUTPATIENT)
Dept: PULMONOLOGY | Facility: CLINIC | Age: 73
End: 2023-02-27
Payer: MEDICARE

## 2023-02-27 VITALS
TEMPERATURE: 95.3 F | SYSTOLIC BLOOD PRESSURE: 118 MMHG | HEIGHT: 73 IN | DIASTOLIC BLOOD PRESSURE: 62 MMHG | HEART RATE: 90 BPM | OXYGEN SATURATION: 94 % | BODY MASS INDEX: 24.57 KG/M2 | WEIGHT: 185.4 LBS

## 2023-02-27 DIAGNOSIS — R91.1 LUNG NODULE: Primary | ICD-10-CM

## 2023-02-27 DIAGNOSIS — D49.0 PAROTID TUMOR: ICD-10-CM

## 2023-02-27 PROCEDURE — 99214 OFFICE O/P EST MOD 30 MIN: CPT | Performed by: INTERNAL MEDICINE

## 2023-02-27 NOTE — PROGRESS NOTES
"CC:    Shortness of breath    HPI:    72 y.o. WM w/ h/o extensive tobacco abuse ~100 py+ - started age 15 - max 3ppd - currently 1 ppd, COPD, HTN, HLD, CAD s/p stents, RA on Humira for ~2 years, who is referred today for evaluation of dyspnea and COPD.  Patient does note PRESCOTT, particularly worse with inclines, carrying things, and for longer distances on flat surface.  Prescribed symbicort.  Hardly ever uses albuterol.  Does have cough, worse in mornings.  No hemoptysis.  Follows with Rheum at .  Had negative quantiferon in 2018.  Prior CT scans from 11/2021 and 12/2019 show emphysema, scattered granulomas, calcified pleural plaques, and RUL cavity which has remained stable.   PFT at  read as \"severe obstruction\", actual numbers / report pending.    INTERVAL HISTORY:    72 y.o. patient returns today for follow up.  No changes since last visit.    PMH:    Past Medical History:   Diagnosis Date   • Arthritis    • Benign hypertension    • CAD (coronary artery disease)     stents-2012   • Colon polyp    • COPD (chronic obstructive pulmonary disease) (HCC)    • Dyspnea    • Family history of heart disease    • Hypercholesterolemia    • Rheumatoid arthritis (HCC)    • Tobacco abuse      2 packs per day     PSH:    Past Surgical History:   Procedure Laterality Date   • CARDIAC CATHETERIZATION     • CARDIAC CATHETERIZATION N/A 11/18/2016    Procedure: Left Heart Cath;  Surgeon: Derrick Ontiveros MD;  Location:  [facility] CATH INVASIVE LOCATION;  Service:    • COLONOSCOPY     • CORONARY STENT PLACEMENT     • ENDOSCOPY N/A 03/08/2022    Procedure: ESOPHAGOGASTRODUODENOSCOPY;  Surgeon: Edi Read MD;  Location:  [facility] ENDOSCOPY;  Service: Gastroenterology;  Laterality: N/A;   • FINGER SURGERY      Right index finger removed secondary to working accident   • KIDNEY STONE SURGERY  09/15/2022    done twice     FH:    Family History   Problem Relation Age of Onset   • No Known Problems Mother    • No Known Problems Father  "   • Colon cancer Neg Hx    • Colon polyps Neg Hx    • Esophageal cancer Neg Hx      SH:    Social History     Socioeconomic History   • Marital status:    Tobacco Use   • Smoking status: Every Day     Packs/day: 1.00     Years: 45.00     Pack years: 45.00     Types: Cigarettes   • Smokeless tobacco: Never   Vaping Use   • Vaping Use: Never used   Substance and Sexual Activity   • Alcohol use: No   • Drug use: No   • Sexual activity: Defer     ALLERGIES:    No Known Allergies  MEDICATIONS:      Current Outpatient Medications:   •  albuterol sulfate  (90 Base) MCG/ACT inhaler, Inhale 2 puffs Every 4 (Four) Hours As Needed for Wheezing or Shortness of Air., Disp: 18 g, Rfl: 11  •  amLODIPine-valsartan (EXFORGE) 5-320 MG per tablet, Take 1 tablet by mouth Daily., Disp: 90 tablet, Rfl: 4  •  aspirin 81 MG EC tablet, Take 81 mg by mouth Every Night., Disp: , Rfl:   •  esomeprazole (nexIUM) 40 MG capsule, Take 1 capsule by mouth Daily., Disp: 30 capsule, Rfl: 11  •  Multiple Vitamins-Minerals (SENTRY SENIOR PO), Take  by mouth Daily., Disp: , Rfl:   •  rosuvastatin (CRESTOR) 40 MG tablet, Take 1 tablet by mouth Daily., Disp: 90 tablet, Rfl: 4  •  Sarilumab (Kevzara) 200 MG/1.14ML solution prefilled syringe, Inject 1 pen under the skin into the appropriate area as directed Every 14 (Fourteen) Days., Disp: , Rfl:   ROS:  Per HPI    DIAGNOSTIC DATA (Reviewed and interpreted by me unless otherwise specified):    CT Chest 11/2021 & 12/2019 (both at ) -  emphysema, scattered granulomas, calcified pleural plaques, and RUL cavity which has remained stable    CT Chest 11/9/22 - compared to 11/2021 stable emphysema, scattered granulomas, calcified pleural plaques, however, RUL apical cavity appears to have a thicker wall    PET CT 2/15/23 - SUV RUL cavitary lesion 2.3, actually looks slightly less thick to me compared to CT Nov 2022.  Incidental finding right parotid gland.    PFT 12/7/22 - moderately severe  obstruction, +BD change, no restriction, +air trapping, mod dec DLCO    6MW 12/7/22 - starting sat 97% on RA, min sat 90% on RA (mostly 94%), walked 350 meters, 58% predicted    Vitals:    02/27/23 1414   BP: 118/62   Pulse: 90   Temp: 95.3 °F (35.2 °C)   SpO2: 94%       Physical Exam:    Constitutional: Alert, no Distress  Mouth/Throat: Oropharynx is clear and moist.   Cardiovascular: Normal rate, regular rhythm, normal heart sounds.   Pulmonary/Chest: Effort normal and breath sounds normal.  No clubbing.       Assessment & Plan     1)  COPD GOLD 2A  2)  Tobacco Abuse ~ 100 py+ - currently 1 ppd  3)  RUL Cavity - stable from 12/2019 - 11/2021 - quantiferon neg 2018, thicker wall 11/2022, PET Neg SUV 2.3 2/2023  4)  Benign Asbestos Related Pleural Plaques - carpentry  5)  RA on Sarilumab    Continue Stiolto.  Continue albuterol prn.  Counseled patient on smoking cessation, would recommend NRT with gradual reduction until able to quit.  Chantix worked in past, but doesn't want it right now.      Patient enrolled in Lung CA screening.  Repeat LDCT Nov 2022 shows thicker wall to RUL cavity.  Possible this could be a cavitating squamous cell lung cancer.  Opportunistic infection also possible - on IL-6 inhibitor for RA.      SUV 2.3, and wall of cavity appears slightly less thick.  I think we can continue close monitoring - difficult area to biopsy.  CT Chest in 3 months.  Refer to ENT for Parotid Lesion on PET CT.    RTC 3 months w/ CT Chest    Gavin Turner MD  Pulmonology and Critical Care Medicine  02/27/23 14:52 EST  Electronically Signed    C.C.:  No ref. provider found, Renny Ardon MD

## 2023-05-12 ENCOUNTER — HOSPITAL ENCOUNTER (OUTPATIENT)
Dept: CT IMAGING | Facility: HOSPITAL | Age: 73
Discharge: HOME | End: 2023-05-12
Payer: MEDICARE

## 2023-05-12 DIAGNOSIS — R91.1 LUNG NODULE: ICD-10-CM

## 2023-05-12 PROCEDURE — 71250 CT THORAX DX C-: CPT

## 2023-05-23 ENCOUNTER — OFFICE VISIT (OUTPATIENT)
Dept: PULMONOLOGY | Facility: CLINIC | Age: 73
End: 2023-05-23
Payer: MEDICARE

## 2023-05-23 VITALS
BODY MASS INDEX: 23.43 KG/M2 | SYSTOLIC BLOOD PRESSURE: 126 MMHG | HEIGHT: 73 IN | OXYGEN SATURATION: 96 % | WEIGHT: 176.8 LBS | HEART RATE: 73 BPM | DIASTOLIC BLOOD PRESSURE: 74 MMHG

## 2023-05-23 DIAGNOSIS — J44.9 CHRONIC OBSTRUCTIVE PULMONARY DISEASE, UNSPECIFIED COPD TYPE: Primary | ICD-10-CM

## 2023-05-23 PROCEDURE — 99214 OFFICE O/P EST MOD 30 MIN: CPT | Performed by: INTERNAL MEDICINE

## 2023-05-23 PROCEDURE — 3078F DIAST BP <80 MM HG: CPT | Performed by: INTERNAL MEDICINE

## 2023-05-23 PROCEDURE — 3074F SYST BP LT 130 MM HG: CPT | Performed by: INTERNAL MEDICINE

## 2023-05-23 RX ORDER — FLUTICASONE FUROATE, UMECLIDINIUM BROMIDE AND VILANTEROL TRIFENATATE 200; 62.5; 25 UG/1; UG/1; UG/1
1 POWDER RESPIRATORY (INHALATION) DAILY
Qty: 1 EACH | Refills: 11 | Status: SHIPPED | OUTPATIENT
Start: 2023-05-23

## 2023-05-23 RX ORDER — TIOTROPIUM BROMIDE AND OLODATEROL 3.124; 2.736 UG/1; UG/1
SPRAY, METERED RESPIRATORY (INHALATION) EVERY 24 HOURS
COMMUNITY
End: 2023-05-23

## 2023-05-23 RX ORDER — DIPHENOXYLATE HYDROCHLORIDE AND ATROPINE SULFATE 2.5; .025 MG/1; MG/1
TABLET ORAL
COMMUNITY

## 2023-05-23 RX ORDER — IPRATROPIUM BROMIDE AND ALBUTEROL SULFATE 2.5; .5 MG/3ML; MG/3ML
SOLUTION RESPIRATORY (INHALATION)
COMMUNITY
Start: 2023-03-31

## 2023-05-23 NOTE — PROGRESS NOTES
"CC:    Shortness of breath    HPI:    72 y.o. WM w/ h/o extensive tobacco abuse ~100 py+ - started age 15 - max 3ppd - currently 1 ppd, COPD, HTN, HLD, CAD s/p stents, RA on Humira for ~2 years, who is referred today for evaluation of dyspnea and COPD.  Patient does note PRESCOTT, particularly worse with inclines, carrying things, and for longer distances on flat surface.  Prescribed symbicort.  Hardly ever uses albuterol.  Does have cough, worse in mornings.  No hemoptysis.  Follows with Rheum at .  Had negative quantiferon in 2018.  Prior CT scans from 11/2021 and 12/2019 show emphysema, scattered granulomas, calcified pleural plaques, and RUL cavity which has remained stable.   PFT at  read as \"severe obstruction\", actual numbers / report pending.    INTERVAL HISTORY:    72 y.o. patient returns today for follow up.  Had flare up in march  Of COPD.  Symptoms more recently sound like prominent allergic rhinitis, possible asthma overlap - worse wheezing at night.    PMH:    Past Medical History:   Diagnosis Date   • Arthritis    • Benign hypertension    • CAD (coronary artery disease)     stents-2012   • Colon polyp    • COPD (chronic obstructive pulmonary disease)    • Dyspnea    • Family history of heart disease    • GERD (gastroesophageal reflux disease)    • Hypercholesterolemia    • Rheumatoid arthritis    • Tobacco abuse      2 packs per day     PSH:    Past Surgical History:   Procedure Laterality Date   • CARDIAC CATHETERIZATION     • CARDIAC CATHETERIZATION N/A 11/18/2016    Procedure: Left Heart Cath;  Surgeon: Derrick Ontiveros MD;  Location:  [facility] CATH INVASIVE LOCATION;  Service:    • COLONOSCOPY     • CORONARY STENT PLACEMENT     • ENDOSCOPY N/A 03/08/2022    Procedure: ESOPHAGOGASTRODUODENOSCOPY;  Surgeon: Edi Read MD;  Location:  [facility] ENDOSCOPY;  Service: Gastroenterology;  Laterality: N/A;   • FINGER SURGERY      Right index finger removed secondary to working accident   • KIDNEY STONE " SURGERY  09/15/2022    done twice     FH:    Family History   Problem Relation Age of Onset   • No Known Problems Mother    • No Known Problems Father    • Colon cancer Neg Hx    • Colon polyps Neg Hx    • Esophageal cancer Neg Hx      SH:    Social History     Socioeconomic History   • Marital status:    Tobacco Use   • Smoking status: Every Day     Packs/day: 1.00     Years: 45.00     Pack years: 45.00     Types: Cigarettes   • Smokeless tobacco: Never   Vaping Use   • Vaping Use: Never used   Substance and Sexual Activity   • Alcohol use: No   • Drug use: No   • Sexual activity: Defer     ALLERGIES:    No Known Allergies  MEDICATIONS:      Current Outpatient Medications:   •  albuterol sulfate  (90 Base) MCG/ACT inhaler, Inhale 2 puffs Every 4 (Four) Hours As Needed for Wheezing or Shortness of Air., Disp: 18 g, Rfl: 11  •  amLODIPine-valsartan (EXFORGE) 5-320 MG per tablet, Take 1 tablet by mouth Daily., Disp: 90 tablet, Rfl: 4  •  aspirin 81 MG EC tablet, Take 1 tablet by mouth Every Night., Disp: , Rfl:   •  esomeprazole (nexIUM) 40 MG capsule, Take 1 capsule by mouth Daily., Disp: 30 capsule, Rfl: 11  •  ipratropium-albuterol (DUO-NEB) 0.5-2.5 mg/3 ml nebulizer, , Disp: , Rfl:   •  Multiple Vitamins-Minerals (SENTRY SENIOR PO), Take  by mouth Daily., Disp: , Rfl:   •  multivitamin (THERAGRAN) tablet tablet, Multi Vitamin, Disp: , Rfl:   •  rosuvastatin (CRESTOR) 40 MG tablet, Take 1 tablet by mouth Daily., Disp: 90 tablet, Rfl: 4  •  Sarilumab (Kevzara) 200 MG/1.14ML solution prefilled syringe, Inject 1 pen under the skin into the appropriate area as directed Every 14 (Fourteen) Days., Disp: , Rfl:   •  tiotropium bromide-olodaterol (Stiolto Respimat) 2.5-2.5 MCG/ACT aerosol solution inhaler, Daily., Disp: , Rfl:   ROS:  Per HPI    DIAGNOSTIC DATA (Reviewed and interpreted by me unless otherwise specified):    CT Chest 11/2021 & 12/2019 (both at ) -  emphysema, scattered granulomas, calcified  "pleural plaques, and RUL cavity which has remained stable    CT Chest 11/9/22 - compared to 11/2021 stable emphysema, scattered granulomas, calcified pleural plaques, however, RUL apical cavity appears to have a thicker wall    PET CT 2/15/23 - SUV RUL cavitary lesion 2.3, actually looks slightly less thick to me compared to CT Nov 2022.  Incidental finding right parotid gland.    CT Chest 5/12/23 - overall stable including the RUL cavitary lesion, the \"new\" 6 mm left apical nodule mentioned by the radiologist is not new.    PFT 12/7/22 - moderately severe obstruction, +BD change, no restriction, +air trapping, mod dec DLCO    6MW 12/7/22 - starting sat 97% on RA, min sat 90% on RA (mostly 94%), walked 350 meters, 58% predicted    Vitals:    05/23/23 1344   BP: 126/74   Pulse: 73   SpO2: 96%       Physical Exam:    Constitutional: Alert, no Distress  Mouth/Throat: Oropharynx is clear and moist.   Cardiovascular: Normal rate, regular rhythm, normal heart sounds.   Pulmonary/Chest: Effort normal and breath sounds normal.  No clubbing.       Assessment & Plan     1)  COPD GOLD 2A w/ Likely Asthma Overlap  2)  Tobacco Abuse ~ 100 py+ - currently 1 ppd  3)  RUL Cavity - stable from 12/2019 - 11/2021 - quantiferon neg 2018, thicker wall 11/2022, PET Neg SUV 2.3 2/2023  4)  Benign Asbestos Related Pleural Plaques - carpentry  5)  RA on Sarilumab    Change Stiolto to Trelegy 200 1 puff daily - suspect some degree of Asthma overlap.  Continue albuterol prn.  Add flonase +/- anthiistamine.    Counseled patient on smoking cessation, would recommend NRT with gradual reduction until able to quit.      Patient enrolled in Lung CA screening.  Repeat LDCT Nov 2022 showed thicker wall to RUL cavity.  Subsequently PET neg and stable on follow up imaging - last 5/12/23.  Plan to repeat CT in 6 months, then go back to annual screening.    Gavin Turner MD  Pulmonology and Critical Care Medicine  05/23/23 14:08 EDT  Electronically " Signed    C.C.:  No ref. provider found, Renny Ardon MD

## 2023-05-24 DIAGNOSIS — R91.1 LUNG NODULE: Primary | ICD-10-CM

## 2023-10-24 ENCOUNTER — TELEPHONE (OUTPATIENT)
Dept: GASTROENTEROLOGY | Facility: CLINIC | Age: 73
End: 2023-10-24
Payer: MEDICARE

## 2023-10-24 DIAGNOSIS — K21.00 GASTROESOPHAGEAL REFLUX DISEASE WITH ESOPHAGITIS WITHOUT HEMORRHAGE: ICD-10-CM

## 2023-10-24 RX ORDER — ESOMEPRAZOLE MAGNESIUM 40 MG/1
40 CAPSULE, DELAYED RELEASE ORAL DAILY
Qty: 30 CAPSULE | Refills: 11 | Status: SHIPPED | OUTPATIENT
Start: 2023-10-24

## 2023-10-24 NOTE — TELEPHONE ENCOUNTER
Pts wife called in asking if her  could get a refill on his Nexium 40mg sent to [facility]. LOV: 8-2-2022, Next OV: 12/4/2023. Please advise, thank you.

## 2024-01-02 DIAGNOSIS — R91.1 LUNG NODULE: Primary | ICD-10-CM

## 2024-01-05 ENCOUNTER — HOSPITAL ENCOUNTER (OUTPATIENT)
Dept: CT IMAGING | Facility: HOSPITAL | Age: 74
Discharge: HOME | End: 2024-01-05
Admitting: INTERNAL MEDICINE
Payer: MEDICARE

## 2024-01-05 DIAGNOSIS — R91.1 LUNG NODULE: ICD-10-CM

## 2024-01-05 PROCEDURE — 71250 CT THORAX DX C-: CPT

## 2024-01-10 ENCOUNTER — OFFICE VISIT (OUTPATIENT)
Dept: PULMONOLOGY | Facility: CLINIC | Age: 74
End: 2024-01-10
Payer: MEDICARE

## 2024-01-10 VITALS
DIASTOLIC BLOOD PRESSURE: 62 MMHG | HEIGHT: 73 IN | SYSTOLIC BLOOD PRESSURE: 116 MMHG | BODY MASS INDEX: 23.63 KG/M2 | WEIGHT: 178.3 LBS | OXYGEN SATURATION: 97 % | TEMPERATURE: 97.7 F | HEART RATE: 76 BPM

## 2024-01-10 DIAGNOSIS — R91.1 LUNG NODULE: Primary | ICD-10-CM

## 2024-01-10 DIAGNOSIS — Z72.0 TOBACCO ABUSE: ICD-10-CM

## 2024-01-10 DIAGNOSIS — M05.79 RHEUMATOID ARTHRITIS INVOLVING MULTIPLE SITES WITH POSITIVE RHEUMATOID FACTOR: ICD-10-CM

## 2024-01-10 DIAGNOSIS — J44.9 COPD MIXED TYPE: ICD-10-CM

## 2024-01-10 DIAGNOSIS — R93.89 ABNORMAL CHEST CT: ICD-10-CM

## 2024-01-10 RX ORDER — ALBUTEROL SULFATE 90 UG/1
2 AEROSOL, METERED RESPIRATORY (INHALATION) EVERY 4 HOURS PRN
Qty: 18 G | Refills: 11 | Status: SHIPPED | OUTPATIENT
Start: 2024-01-10

## 2024-01-10 RX ORDER — FLUTICASONE FUROATE, UMECLIDINIUM BROMIDE AND VILANTEROL TRIFENATATE 200; 62.5; 25 UG/1; UG/1; UG/1
1 POWDER RESPIRATORY (INHALATION) DAILY
Qty: 1 EACH | Refills: 11 | Status: SHIPPED | OUTPATIENT
Start: 2024-01-10

## 2024-01-10 RX ORDER — DOXYCYCLINE HYCLATE 100 MG/1
100 CAPSULE ORAL 2 TIMES DAILY
Qty: 20 CAPSULE | Refills: 0 | Status: SHIPPED | OUTPATIENT
Start: 2024-01-10

## 2024-01-10 NOTE — PROGRESS NOTES
PULMONARY  NOTE    Chief Complaint     Moderate chronic obstructive pulmonary disease, ongoing tobacco abuse, abnormal CT scan of the chest, rheumatoid arthritis    History of Present Illness     73-year-old male returns today for follow-up  Last seen in the office 5/23/2023 by Gavin Turner    He has a long history of tobacco abuse which is ongoing  He has greater than 100-pack-year smoking history  No plans for smoking cessation at this time    He has moderate, stage II, chronic obstructive pulmonary disease  He is on Trelegy with albuterol as needed  No recent acute exacerbation    He has rheumatoid arthritis  Previously on Humira  Currently on Kevzara    He has an abnormal CT scan of the chest  On his last CT scan of the chest in the spring he had a new 6 mm pulmonary nodule  That is been followed with serial chest imaging with the most recent CT scan of the chest as noted below    Patient Active Problem List   Diagnosis    CAD (coronary artery disease)    Benign hypertension    Hypercholesterolemia    Non-insulin dependent type 2 diabetes mellitus    Tobacco abuse    Family history of heart disease    Positive cardiac stress test    Benign prostatic hyperplasia    GERD (gastroesophageal reflux disease)    [place] (hard of hearing)    Neck pain    Rheumatoid arthritis involving multiple sites with positive RF (On Kevzara)    Epigastric pain    Dyspepsia    Nausea    Abnormal chest CT (Pulmonary nodules)    Moderate (Stage II) COPD      No Known Allergies    Current Outpatient Medications:     albuterol sulfate  (90 Base) MCG/ACT inhaler, Inhale 2 puffs Every 4 (Four) Hours As Needed for Wheezing or Shortness of Air., Disp: 18 g, Rfl: 11    amLODIPine-valsartan (EXFORGE) 5-320 MG per tablet, Take 1 tablet by mouth Daily., Disp: 90 tablet, Rfl: 4    aspirin 81 MG EC tablet, Take 1 tablet by mouth Every Night., Disp: , Rfl:     esomeprazole (nexIUM) 40 MG capsule, Take 1 capsule by mouth Daily., Disp: 30  "capsule, Rfl: 11    Fluticasone-Umeclidin-Vilant (Trelegy Ellipta) 200-62.5-25 MCG/ACT aerosol powder , Inhale 1 puff Daily., Disp: 1 each, Rfl: 11    ipratropium-albuterol (DUO-NEB) 0.5-2.5 mg/3 ml nebulizer, , Disp: , Rfl:     Multiple Vitamins-Minerals (SENTRY SENIOR PO), Take  by mouth Daily., Disp: , Rfl:     multivitamin (THERAGRAN) tablet tablet, Multi Vitamin, Disp: , Rfl:     rosuvastatin (CRESTOR) 40 MG tablet, Take 1 tablet by mouth Daily., Disp: 90 tablet, Rfl: 4    Sarilumab (Kevzara) 200 MG/1.14ML solution prefilled syringe, Inject 1 pen under the skin into the appropriate area as directed Every 14 (Fourteen) Days., Disp: , Rfl:   MEDICATION LIST AND ALLERGIES REVIEWED.    Family History   Problem Relation Age of Onset    No Known Problems Mother     No Known Problems Father     Colon cancer Neg Hx     Colon polyps Neg Hx     Esophageal cancer Neg Hx      Social History     Tobacco Use    Smoking status: Every Day     Packs/day: 1.00     Years: 45.00     Additional pack years: 0.00     Total pack years: 45.00     Types: Cigarettes    Smokeless tobacco: Never   Vaping Use    Vaping Use: Never used   Substance Use Topics    Alcohol use: No    Drug use: No     Social History     Social History Narrative    Not on file     FAMILY AND SOCIAL HISTORY REVIEWED.    Review of Systems  IF PRESENT REFER TO SCANNED ROS SHEET FROM SAME DATE  OTHERWISE ROS OBTAINED AND NON-CONTRIBUTORY OVER HPI.    /62   Pulse 76   Temp 97.7 °F (36.5 °C)   Ht 185.4 cm (72.99\")   Wt 80.9 kg (178 lb 4.8 oz)   SpO2 97% Comment: room air at rest  BMI 23.53 kg/m²   Physical Exam  Vitals and nursing note reviewed.   Constitutional:       General: He is not in acute distress.     Appearance: He is well-developed. He is not diaphoretic.   HENT:      Head: Normocephalic and atraumatic.   Neck:      Thyroid: No thyromegaly.   Cardiovascular:      Rate and Rhythm: Normal rate and regular rhythm.      Heart sounds: Normal heart " sounds. No murmur heard.  Pulmonary:      Effort: Pulmonary effort is normal.      Breath sounds: Normal breath sounds. No stridor.   Lymphadenopathy:      Cervical: No cervical adenopathy.      Upper Body:      Right upper body: No supraclavicular or epitrochlear adenopathy.      Left upper body: No supraclavicular or epitrochlear adenopathy.   Skin:     General: Skin is warm and dry.   Neurological:      Mental Status: He is alert and oriented to person, place, and time.   Psychiatric:         Behavior: Behavior normal.         Results     CT scan of the chest done 1/5/2024 reviewed on PACS  Stability in the previously noted pulmonary nodules and cavitary process in the right upper lobe    PFTs reveal moderate airway obstruction, no restriction, and a reduced diffusion capacity    Immunization History   Administered Date(s) Administered    COVID-19 (MODERNA) 1st,2nd,3rd Dose Monovalent 03/04/2021, 04/09/2021, 01/05/2022    Flublock Quad =>18yrs 12/10/2019    Fluzone High Dose =>65 Years ([employer] ONLY) 10/22/2021    Fluzone High-Dose 65+yrs 12/07/2022    Influenza recombinant 12/10/2019    Pneumococcal Conjugate 13-Valent (PCV13) 06/05/2017    Shingrix 01/10/2020    Tdap 09/09/2016     Problem List       ICD-10-CM ICD-9-CM   1. Lung nodule  R91.1 793.11   2. Rheumatoid arthritis involving multiple sites with positive RF (On Kevzara)  M05.79 714.0   3. Abnormal chest CT (Pulmonary nodules)  R93.89 793.2   4. Tobacco abuse  Z72.0 305.1   5. Moderate (Stage II) COPD  J44.9 496       Discussion     We reviewed his test results  CT scan of the chest is stable  I recommended a repeat noncontrast CT scan of the chest in 6 months    We reviewed his PFTs which are stable  He will remain on Trelegy with albuterol as needed    We discussed the need for total smoking abstinence and smoking cessation strategies  Currently no plans for smoking cessation    I will plan to see him back in 6 months with a repeat CT scan of the  chest    Level of service justified based on 42 minutes spent in patient care on this date of service including, but not limited to: preparing to see the patient, obtaining and/or reviewing history, performing medically appropriate examination, ordering tests/medicine/procedures, independently interpreting results, documenting clinical information in EHR, and counseling/education of patient/family/caregiver (excluding time spent on other separate services such as performing procedures or test interpretation, if applicable). (Level 4 30-39 minutes; Level 5 40-54 minutes)    Anthony Turner MD  Note electronically signed    CC: Renny Ardon MD

## 2024-01-31 ENCOUNTER — OFFICE VISIT (OUTPATIENT)
Dept: CARDIOLOGY | Facility: CLINIC | Age: 74
End: 2024-01-31
Payer: MEDICARE

## 2024-01-31 VITALS
WEIGHT: 178 LBS | OXYGEN SATURATION: 97 % | SYSTOLIC BLOOD PRESSURE: 136 MMHG | BODY MASS INDEX: 23.59 KG/M2 | HEART RATE: 74 BPM | HEIGHT: 73 IN | DIASTOLIC BLOOD PRESSURE: 70 MMHG

## 2024-01-31 DIAGNOSIS — R06.09 DYSPNEA ON EXERTION: ICD-10-CM

## 2024-01-31 DIAGNOSIS — E78.00 HYPERCHOLESTEROLEMIA: ICD-10-CM

## 2024-01-31 DIAGNOSIS — I10 BENIGN HYPERTENSION: ICD-10-CM

## 2024-01-31 DIAGNOSIS — Z72.0 TOBACCO ABUSE: ICD-10-CM

## 2024-01-31 DIAGNOSIS — I25.10 CORONARY ARTERY DISEASE INVOLVING NATIVE CORONARY ARTERY OF NATIVE HEART WITHOUT ANGINA PECTORIS: Primary | ICD-10-CM

## 2024-01-31 PROCEDURE — 99214 OFFICE O/P EST MOD 30 MIN: CPT | Performed by: INTERNAL MEDICINE

## 2024-01-31 PROCEDURE — 3078F DIAST BP <80 MM HG: CPT | Performed by: INTERNAL MEDICINE

## 2024-01-31 PROCEDURE — 3075F SYST BP GE 130 - 139MM HG: CPT | Performed by: INTERNAL MEDICINE

## 2024-01-31 NOTE — PROGRESS NOTES
[facility] at [facility]  Office visit  Boo White  1950  [phone]  VISIT DATE:  1/31/2024      PCP: Renny Ardon MD  [address] [place] [id number]    CC:  Chief Complaint   Patient presents with    Coronary artery disease involving native coronary artery       PROBLEM LIST:  Coronary artery disease with abnormal Cardiolite:  A 6-9 month history of progressive shortness of breath/dyspnea upon exertion.  Abnormal EKG revealing inferior ST downsloping.  Echocardiogram, 05/15/2012: Ejection fraction normal at 60%, mild diastolic dysfunction, trace mitral regurgitation, mild tricuspid regurgitation with an RVSP at 33.  Abnormal Lexiscan Cardiolite: The patient walked 3 minutes with abnormal imaging studies.  Left heart catheterization, 08/30/2012: Status post FFR and drug-eluting stent to the mid and proximal RCA using a 4.6 mm Promus stent proximally and 3 x 32 in the mid vessel and stenting of the left circumflex using a 2.75 x 16 mm Promus drug-eluting stent.  Benign hypertension  Hypercholesterolemia  Non-insulin-dependent type 2, diabetes mellitus  Ongoing tobacco abuse; 2 packs per day  Family history of heart disease  Surgical history:  Right index finger removed secondary to working accident     February 2021 ABIs: Normal    ASSESSMENT:   Diagnosis Plan   1. Coronary artery disease involving native coronary artery of native heart without angina pectoris        2. Hypercholesterolemia        3. Benign hypertension        4. Tobacco abuse              PLAN:  Coronary artery disease: Previous anginal equivalent of dyspnea on exertion and fatigue.  Xochilt scan myocardial perfusion imaging pending for ischemia evaluation.  Transthoracic echo pending to assess underlying myocardial structure and function.  Continue aspirin, afterload reduction and statin therapy.    Hypertension: Goal less than 130/80 mmHg.  Well-controlled at home.  Continue combination of  "amlodipine and valsartan    Hyperlipidemia: Goal LDL less than 70.  Continue rosuvastatin 40 mg p.o. daily.  Lipid profile pending.    Nicotine abuse: Counseled on need for smoking cessation.  Currently not ready to set a quit date.  Currently smoking a pack per day.  Reports that is the only thing that keeps him calm.      Subjective  COPD.  Describes shortness of breath in a class III pattern with such activities as going up a flights of stairs or walking up an incline carrying something or taking out the trash.  Currently smoking a pack per day, previously smoked as much as 2 packs per day.  Blood pressures running less than 140/90 mmHg.  Denies chest discomfort and palpitations.  He appears compliant with medical therapy.      PHYSICAL EXAMINATION:  Vitals:    01/31/24 1052   BP: 136/70   BP Location: Left arm   Patient Position: Sitting   Pulse: 74   SpO2: 97%   Weight: 80.7 kg (178 lb)   Height: 185.4 cm (73\")       General Appearance:    Alert, cooperative, no distress, appears stated age   Head:    Normocephalic, without obvious abnormality, atraumatic   Eyes:    conjunctiva/corneas clear   Nose:   Nares normal, septum midline, mucosa normal, no drainage   Throat:   Lips, teeth and gums normal   Neck:   Supple, symmetrical, trachea midline, no carotid    bruit or JVD   Lungs:     Diminished throughout, respirations unlabored   Chest Wall:    No tenderness or deformity    Heart:    Regular rate and rhythm, S1 and S2 normal, no murmur, rub   or gallop, normal carotid impulse bilaterally without bruit.   Abdomen:     Soft, non-tender   Extremities:   Extremities normal, atraumatic, no cyanosis or edema   Pulses:  Pedal pulses are difficult to appreciate   Skin:   Skin color, texture, turgor normal, no rashes or lesions       Diagnostic Data:  Procedures  Lab Results   Component Value Date    CHLPL 149 04/12/2022    TRIG 69 04/12/2022    HDL 47 04/12/2022     Lab Results   Component Value Date    GLUCOSE 121 (H) " 01/25/2021    BUN 21 04/12/2022    CREATININE 0.93 04/12/2022     04/12/2022    K 4.6 04/12/2022     04/12/2022    CO2 24 04/12/2022     Lab Results   Component Value Date    HGBA1C 5.20 11/18/2016     Lab Results   Component Value Date    WBC 3.26 (L) 08/02/2022    HGB 14.4 08/02/2022    HCT 41.9 08/02/2022     08/02/2022       Allergies  No Known Allergies    Current Medications    Current Outpatient Medications:     albuterol sulfate  (90 Base) MCG/ACT inhaler, Inhale 2 puffs Every 4 (Four) Hours As Needed for Wheezing or Shortness of Air., Disp: 18 g, Rfl: 11    amLODIPine-valsartan (EXFORGE) 5-320 MG per tablet, Take 1 tablet by mouth Daily., Disp: 90 tablet, Rfl: 4    aspirin 81 MG EC tablet, Take 1 tablet by mouth Every Night., Disp: , Rfl:     esomeprazole (nexIUM) 40 MG capsule, Take 1 capsule by mouth Daily., Disp: 30 capsule, Rfl: 11    Fluticasone-Umeclidin-Vilant (Trelegy Ellipta) 200-62.5-25 MCG/ACT aerosol powder , Inhale 1 puff Daily., Disp: 1 each, Rfl: 11    Multiple Vitamins-Minerals (SENTRY SENIOR PO), Take  by mouth Daily., Disp: , Rfl:     multivitamin (THERAGRAN) tablet tablet, Multi Vitamin, Disp: , Rfl:     rosuvastatin (CRESTOR) 40 MG tablet, Take 1 tablet by mouth Daily., Disp: 90 tablet, Rfl: 4    Sarilumab (Kevzara) 200 MG/1.14ML solution prefilled syringe, Inject 1 pen under the skin into the appropriate area as directed Every 14 (Fourteen) Days., Disp: , Rfl:     ipratropium-albuterol (DUO-NEB) 0.5-2.5 mg/3 ml nebulizer, , Disp: , Rfl:           ROS  Review of Systems   Cardiovascular:  Positive for dyspnea on exertion. Negative for chest pain, irregular heartbeat, near-syncope, palpitations and syncope.   Respiratory:  Positive for cough, shortness of breath and wheezing. Negative for sputum production.    Neurological:  Positive for dizziness.     SOCIAL HX  Social History     Socioeconomic History    Marital status:    Tobacco Use    Smoking status:  Every Day     Packs/day: 1.00     Years: 45.00     Additional pack years: 0.00     Total pack years: 45.00     Types: Cigarettes     Start date: 1966     Passive exposure: Current    Smokeless tobacco: Never   Vaping Use    Vaping Use: Never used   Substance and Sexual Activity    Alcohol use: No    Drug use: No    Sexual activity: Defer       FAMILY HX  Family History   Problem Relation Age of Onset    No Known Problems Mother     No Known Problems Father     Asthma Brother     Colon cancer Neg Hx     Colon polyps Neg Hx     Esophageal cancer Neg Hx              Anthony Riley III, MD, FACC

## 2024-02-29 ENCOUNTER — LAB (OUTPATIENT)
Age: 74
End: 2024-02-29
Payer: MEDICARE

## 2024-02-29 ENCOUNTER — OFFICE VISIT (OUTPATIENT)
Age: 74
End: 2024-02-29
Payer: MEDICARE

## 2024-02-29 ENCOUNTER — HOSPITAL ENCOUNTER (OUTPATIENT)
Dept: CT IMAGING | Facility: HOSPITAL | Age: 74
Discharge: HOME | End: 2024-02-29
Admitting: INTERNAL MEDICINE
Payer: MEDICARE

## 2024-02-29 VITALS
DIASTOLIC BLOOD PRESSURE: 80 MMHG | SYSTOLIC BLOOD PRESSURE: 138 MMHG | BODY MASS INDEX: 24.12 KG/M2 | HEIGHT: 73 IN | OXYGEN SATURATION: 95 % | HEART RATE: 78 BPM | WEIGHT: 182 LBS

## 2024-02-29 DIAGNOSIS — I25.10 CORONARY ARTERY DISEASE INVOLVING NATIVE CORONARY ARTERY OF NATIVE HEART WITHOUT ANGINA PECTORIS: ICD-10-CM

## 2024-02-29 DIAGNOSIS — R04.2 HEMOPTYSIS: Primary | ICD-10-CM

## 2024-02-29 DIAGNOSIS — I10 BENIGN HYPERTENSION: ICD-10-CM

## 2024-02-29 DIAGNOSIS — K21.00 GASTROESOPHAGEAL REFLUX DISEASE WITH ESOPHAGITIS WITHOUT HEMORRHAGE: ICD-10-CM

## 2024-02-29 DIAGNOSIS — Z72.0 TOBACCO ABUSE: ICD-10-CM

## 2024-02-29 DIAGNOSIS — E78.00 HYPERCHOLESTEROLEMIA: ICD-10-CM

## 2024-02-29 DIAGNOSIS — M05.79 RHEUMATOID ARTHRITIS INVOLVING MULTIPLE SITES WITH POSITIVE RHEUMATOID FACTOR: ICD-10-CM

## 2024-02-29 DIAGNOSIS — R04.2 HEMOPTYSIS: ICD-10-CM

## 2024-02-29 DIAGNOSIS — R93.89 ABNORMAL CHEST CT: ICD-10-CM

## 2024-02-29 DIAGNOSIS — J44.1 COPD WITH EXACERBATION: ICD-10-CM

## 2024-02-29 DIAGNOSIS — J44.9 COPD MIXED TYPE: ICD-10-CM

## 2024-02-29 DIAGNOSIS — R04.2 COUGHING UP BLOOD: Primary | ICD-10-CM

## 2024-02-29 DIAGNOSIS — J18.9 PNEUMONIA OF LEFT LOWER LOBE DUE TO INFECTIOUS ORGANISM: ICD-10-CM

## 2024-02-29 PROBLEM — R10.13 EPIGASTRIC PAIN: Status: RESOLVED | Noted: 2022-02-24 | Resolved: 2024-02-29

## 2024-02-29 PROBLEM — R10.13 DYSPEPSIA: Status: RESOLVED | Noted: 2022-02-24 | Resolved: 2024-02-29

## 2024-02-29 LAB
EXPIRATION DATE: NORMAL
FLUAV AG UPPER RESP QL IA.RAPID: NOT DETECTED
FLUBV AG UPPER RESP QL IA.RAPID: NOT DETECTED
INTERNAL CONTROL: NORMAL
Lab: NORMAL
SARS-COV-2 AG UPPER RESP QL IA.RAPID: NOT DETECTED

## 2024-02-29 PROCEDURE — 83735 ASSAY OF MAGNESIUM: CPT | Performed by: INTERNAL MEDICINE

## 2024-02-29 PROCEDURE — 85610 PROTHROMBIN TIME: CPT | Performed by: INTERNAL MEDICINE

## 2024-02-29 PROCEDURE — 25510000001 IOPAMIDOL PER 1 ML: Performed by: INTERNAL MEDICINE

## 2024-02-29 PROCEDURE — 71275 CT ANGIOGRAPHY CHEST: CPT

## 2024-02-29 PROCEDURE — 80053 COMPREHEN METABOLIC PANEL: CPT | Performed by: INTERNAL MEDICINE

## 2024-02-29 PROCEDURE — 36415 COLL VENOUS BLD VENIPUNCTURE: CPT | Performed by: INTERNAL MEDICINE

## 2024-02-29 PROCEDURE — 80061 LIPID PANEL: CPT | Performed by: INTERNAL MEDICINE

## 2024-02-29 PROCEDURE — 85025 COMPLETE CBC W/AUTO DIFF WBC: CPT | Performed by: INTERNAL MEDICINE

## 2024-02-29 PROCEDURE — 85730 THROMBOPLASTIN TIME PARTIAL: CPT | Performed by: INTERNAL MEDICINE

## 2024-02-29 RX ORDER — PREDNISONE 20 MG/1
40 TABLET ORAL DAILY
Qty: 10 TABLET | Refills: 0 | Status: SHIPPED | OUTPATIENT
Start: 2024-02-29

## 2024-02-29 RX ORDER — DOXYCYCLINE HYCLATE 100 MG/1
100 CAPSULE ORAL 2 TIMES DAILY
Qty: 20 CAPSULE | Refills: 0 | Status: SHIPPED | OUTPATIENT
Start: 2024-02-29 | End: 2024-02-29

## 2024-02-29 RX ORDER — AMOXICILLIN AND CLAVULANATE POTASSIUM 875; 125 MG/1; MG/1
1 TABLET, FILM COATED ORAL 2 TIMES DAILY
Qty: 20 TABLET | Refills: 0 | Status: SHIPPED | OUTPATIENT
Start: 2024-02-29

## 2024-02-29 RX ORDER — AZITHROMYCIN 250 MG/1
TABLET, FILM COATED ORAL
Qty: 6 TABLET | Refills: 0 | Status: SHIPPED | OUTPATIENT
Start: 2024-02-29 | End: 2024-03-05

## 2024-02-29 RX ADMIN — IOPAMIDOL 85 ML: 755 INJECTION, SOLUTION INTRAVENOUS at 17:14

## 2024-02-29 NOTE — PROGRESS NOTES
"New Patient Note    Subjective      Boo \"Iam\" is a 73 y.o. male.    Chief Complaint   Patient presents with    Establish Care       HPI    Hemoptysis  - started on Monday  - put down hardwood that day, exposed to a lot of dust and material  - started having hemoptysis about a tablespoon in amount with each cough, coughing numerous times per day  - he says that it is always mixed with mucous, today what was spit up was a little yellow/green tinged  - cough is more than baseline  - increased shortness of breath, using Duo Neb daily  - white in appearance normal, but in office some yellow/green tinge but new blood and that its different  - does not look like old blood, typically a bright red appearance  - no fever  - some chills and fatigued  - no sick contacts  - both sides of chest hurt up high  - a little more on the left than right  - only hurts when cough  - no pain with breathing  - started Duo nebs on Tuesday, using once a day  - Trelegy continued  - breathing worse getting out of shower is worse and that started before working with hardwood   - always feels stopped up but not worse than normal  - left nostril has some blood tinged snot but not of same brightness and not running down throat  - no history of clots  - no CHF    CAD  - follows with Dr. Riley  - PCI in 2012  - on ASA  - on HTN meds, cholesterol meds  - last heart cath in 2016  - stress test and ECHO pending due to some increased SOA and oralia with heavy lifting etc    HTN  - Amlodipine and Valsartan  - controls things well    GERD  - Nexium controls GERD, does not take every day    HPL  - Crestor    Rheumatoid Arthritis  - Kevzara  - Follows with [facility]    BPH  - not on medication  - no obstructive symtpoms    Tobacco Abuse  - continues to smoke  - 100 pack year history    Moderate Stage 2 COPD  - Trelegy  - Follows with Dr. Jonhny Grigsby    Pulju Nodule  - following a 6mm nodule  - recent CT scan 1/2024, planning for 6 month scan    No history of " DM2    Past Medical History:  Patient Active Problem List   Diagnosis    CAD (coronary artery disease)    Benign hypertension    Hypercholesterolemia    Non-insulin dependent type 2 diabetes mellitus    Tobacco abuse    Family history of heart disease    Positive cardiac stress test    Benign prostatic hyperplasia    GERD (gastroesophageal reflux disease)    [place] (hard of hearing)    Neck pain    Rheumatoid arthritis involving multiple sites with positive RF (On Kevzara)    Epigastric pain    Dyspepsia    Nausea    Abnormal chest CT (Pulmonary nodules)    Moderate (Stage II) COPD        Medications:    Current Outpatient Medications:     albuterol sulfate  (90 Base) MCG/ACT inhaler, Inhale 2 puffs Every 4 (Four) Hours As Needed for Wheezing or Shortness of Air., Disp: 18 g, Rfl: 11    amLODIPine-valsartan (EXFORGE) 5-320 MG per tablet, Take 1 tablet by mouth Daily., Disp: 90 tablet, Rfl: 4    aspirin 81 MG EC tablet, Take 1 tablet by mouth Every Night., Disp: , Rfl:     esomeprazole (nexIUM) 40 MG capsule, Take 1 capsule by mouth Daily., Disp: 30 capsule, Rfl: 11    Fluticasone-Umeclidin-Vilant (Trelegy Ellipta) 200-62.5-25 MCG/ACT aerosol powder , Inhale 1 puff Daily., Disp: 1 each, Rfl: 11    Multiple Vitamins-Minerals (SENTRY SENIOR PO), Take  by mouth Daily., Disp: , Rfl:     multivitamin (THERAGRAN) tablet tablet, Multi Vitamin, Disp: , Rfl:     rosuvastatin (CRESTOR) 40 MG tablet, Take 1 tablet by mouth Daily., Disp: 90 tablet, Rfl: 4    Sarilumab (Kevzara) 200 MG/1.14ML solution prefilled syringe, Inject 1 pen under the skin into the appropriate area as directed Every 14 (Fourteen) Days., Disp: , Rfl:     ipratropium-albuterol (DUO-NEB) 0.5-2.5 mg/3 ml nebulizer, , Disp: , Rfl:      Allergy to Medications:  No Known Allergies     Immunizations:  Immunization History   Administered Date(s) Administered    COVID-19 (MODERNA) 1st,2nd,3rd Dose Monovalent 03/04/2021, 04/09/2021, 01/05/2022    Dakota Quad  "=>18yrs 12/10/2019    Fluzone High Dose =>65 Years ([employer] ONLY) 10/22/2021    Fluzone High-Dose 65+yrs 12/07/2022    Influenza recombinant 12/10/2019    Pneumococcal Conjugate 13-Valent (PCV13) 06/05/2017    Shingrix 01/10/2020    Tdap 09/09/2016       Surgeries:  Past Surgical History:   Procedure Laterality Date    CARDIAC CATHETERIZATION      CARDIAC CATHETERIZATION N/A 11/18/2016    Procedure: Left Heart Cath;  Surgeon: Derrick Ontiveros MD;  Location:  [facility];  Service:     COLONOSCOPY      CORONARY STENT PLACEMENT      ENDOSCOPY N/A 03/08/2022    Procedure: ESOPHAGOGASTRODUODENOSCOPY;  Surgeon: Edi Read MD;  Location:  [facility] ENDOSCOPY;  Service: Gastroenterology;  Laterality: N/A;    FINGER SURGERY      Right index finger removed secondary to working accident    KIDNEY STONE SURGERY  09/15/2022    done twice        Family History:  Family History   Problem Relation Age of Onset    No Known Problems Mother     No Known Problems Father     Asthma Brother     Colon cancer Neg Hx     Colon polyps Neg Hx     Esophageal cancer Neg Hx         Social:  Tobacco Use: 100 pack year history  ETOH Use:no  Drug Use:no  Household:Wife  Occupation:Retired [employer]  Hobbies:  Exercise:      Objective   /80   Pulse 78   Ht 185.4 cm (72.99\")   Wt 82.6 kg (182 lb)   SpO2 95%   BMI 24.02 kg/m²   BMI is within normal parameters. No other follow-up for BMI required.       Physical Exam  Vitals reviewed.   Constitutional:       General: He is not in acute distress.     Appearance: Normal appearance.   HENT:      Right Ear: Tympanic membrane, ear canal and external ear normal.      Left Ear: Tympanic membrane, ear canal and external ear normal.      Nose: No congestion or rhinorrhea.      Mouth/Throat:      Mouth: Mucous membranes are moist.      Pharynx: No oropharyngeal exudate or posterior oropharyngeal erythema.   Eyes:      Conjunctiva/sclera: Conjunctivae normal.   Cardiovascular: "      Rate and Rhythm: Normal rate and regular rhythm.      Heart sounds: Normal heart sounds. No murmur heard.  Pulmonary:      Effort: Pulmonary effort is normal. No respiratory distress.      Breath sounds: Normal breath sounds.   Abdominal:      General: Abdomen is flat. Bowel sounds are normal. There is no distension.      Palpations: Abdomen is soft.      Tenderness: There is no abdominal tenderness. There is no guarding or rebound.   Skin:     General: Skin is warm and dry.   Neurological:      Mental Status: He is alert.   Psychiatric:         Mood and Affect: Mood normal.         Thought Content: Thought content normal.         Judgment: Judgment normal.         Assessment & Plan     Hemoptysis  LLL PNA  COPD Exacerbation  Moderate Stage II COPD  - approx 15ml bright red blood, numerous times per day with coughing for 3 days  - noted some chills and fatigue as well as pain over left side of chest with coughing, pt may not mount fever as on immunosuppression for  RA  - given hemoptysis will obtain CBC, CMP, PT/PTT  - rapid Flu, COVID negative in the office today  - obtained CTPE today and showed new LLL PNA and no clot, stable nodules as compared to the 1/2024 CT noncon study  - originally sent Prednisone 40mg daily for 5 days and Annabelley, called family to let them know post visit that CTPE showed PNA, in lieu of that will switch to Augmentin and Azith  - will hold baby ASA for 1 week   - will continue Duo Nebs as needed  - will continue Trelegy  - advised if hemoptysis continues until Monday 3/4, to call and will touch base with Pulm  - advised to call clinic or go to ED if increased hemoptysis or SOA    Pulmonary Nodules  - in the summer had new 6mm nodule  - Pulm following, last CT chest 1/2024  - next CT chest planned 7/2024    RA  - previously on Humira, now on Kevzara  - followed by [facility]    GERD  - encouraged to take Nexium daily     Tobacco Abuse  - continues to smoke, encouraged cessation    HTN  -  well controlled  - continue Amlodipine and Valsartan    HPL  - continue Crestor  - Lipids last checked 2/2024    BPH  - no obstructive sx  - not currently on medication    FU in 1 week    Time: 60min on face to face and non face to face time on day of visit to discuss concerns, arrange stat CT, review Pulm note, review Cards note, review [facility] 2/2024 labs, review CT chest 1/2024,  and documentation.     Health Care Maintenance:discuss at future visit  HIV Screen:  Hep C Screen:  GC/Chlamydia Screen:    Colon Ca Screening: Start Screening at 46yo / Last Colonoscopy:  Result:  Mammogram: Start Screening at 39yo / Last mammogram:  Result:  Prostate Cancer Screening:  Lung Cancer: Positive smoking history screen at 49yo/Last result:    Immunizations:    DEXA:  AAA:    Lipids:  A1C:    Kindra White MD, FAAP, FACP  Internal Medicine and Pediatrics  [facility] [place]

## 2024-03-01 LAB
ALBUMIN SERPL-MCNC: 3.6 G/DL (ref 3.5–5.2)
ALBUMIN/GLOB SERPL: 1.4 G/DL
ALP SERPL-CCNC: 78 U/L (ref 39–117)
ALT SERPL W P-5'-P-CCNC: 23 U/L (ref 1–41)
ANION GAP SERPL CALCULATED.3IONS-SCNC: 10.6 MMOL/L (ref 5–15)
APTT PPP: 26.4 SECONDS (ref 22–39)
AST SERPL-CCNC: 26 U/L (ref 1–40)
BASOPHILS # BLD AUTO: 0.04 10*3/MM3 (ref 0–0.2)
BASOPHILS NFR BLD AUTO: 0.5 % (ref 0–1.5)
BILIRUB SERPL-MCNC: 0.3 MG/DL (ref 0–1.2)
BUN SERPL-MCNC: 20 MG/DL (ref 8–23)
BUN/CREAT SERPL: 20 (ref 7–25)
CALCIUM SPEC-SCNC: 9.1 MG/DL (ref 8.6–10.5)
CHLORIDE SERPL-SCNC: 108 MMOL/L (ref 98–107)
CHOLEST SERPL-MCNC: 120 MG/DL (ref 0–200)
CO2 SERPL-SCNC: 24.4 MMOL/L (ref 22–29)
CREAT SERPL-MCNC: 1 MG/DL (ref 0.76–1.27)
DEPRECATED RDW RBC AUTO: 41.8 FL (ref 37–54)
EGFRCR SERPLBLD CKD-EPI 2021: 80.5 ML/MIN/1.73
EOSINOPHIL # BLD AUTO: 0.18 10*3/MM3 (ref 0–0.4)
EOSINOPHIL NFR BLD AUTO: 2.3 % (ref 0.3–6.2)
ERYTHROCYTE [DISTWIDTH] IN BLOOD BY AUTOMATED COUNT: 11.9 % (ref 12.3–15.4)
GLOBULIN UR ELPH-MCNC: 2.6 GM/DL
GLUCOSE SERPL-MCNC: 89 MG/DL (ref 65–99)
HCT VFR BLD AUTO: 41.1 % (ref 37.5–51)
HDLC SERPL-MCNC: 44 MG/DL (ref 40–60)
HGB BLD-MCNC: 14 G/DL (ref 13–17.7)
IMM GRANULOCYTES # BLD AUTO: 0.04 10*3/MM3 (ref 0–0.05)
IMM GRANULOCYTES NFR BLD AUTO: 0.5 % (ref 0–0.5)
INR PPP: 0.96 (ref 0.89–1.12)
LDLC SERPL CALC-MCNC: 62 MG/DL (ref 0–100)
LDLC/HDLC SERPL: 1.42 {RATIO}
LYMPHOCYTES # BLD AUTO: 1.21 10*3/MM3 (ref 0.7–3.1)
LYMPHOCYTES NFR BLD AUTO: 15.2 % (ref 19.6–45.3)
MAGNESIUM SERPL-MCNC: 2.1 MG/DL (ref 1.6–2.4)
MCH RBC QN AUTO: 32.9 PG (ref 26.6–33)
MCHC RBC AUTO-ENTMCNC: 34.1 G/DL (ref 31.5–35.7)
MCV RBC AUTO: 96.7 FL (ref 79–97)
MONOCYTES # BLD AUTO: 0.93 10*3/MM3 (ref 0.1–0.9)
MONOCYTES NFR BLD AUTO: 11.7 % (ref 5–12)
NEUTROPHILS NFR BLD AUTO: 5.56 10*3/MM3 (ref 1.7–7)
NEUTROPHILS NFR BLD AUTO: 69.8 % (ref 42.7–76)
NRBC BLD AUTO-RTO: 0 /100 WBC (ref 0–0.2)
PLATELET # BLD AUTO: 191 10*3/MM3 (ref 140–450)
PMV BLD AUTO: 11.5 FL (ref 6–12)
POTASSIUM SERPL-SCNC: 4.1 MMOL/L (ref 3.5–5.2)
PROT SERPL-MCNC: 6.2 G/DL (ref 6–8.5)
PROTHROMBIN TIME: 12.9 SECONDS (ref 12.2–14.5)
RBC # BLD AUTO: 4.25 10*6/MM3 (ref 4.14–5.8)
SODIUM SERPL-SCNC: 143 MMOL/L (ref 136–145)
TRIGL SERPL-MCNC: 68 MG/DL (ref 0–150)
VLDLC SERPL-MCNC: 14 MG/DL (ref 5–40)
WBC NRBC COR # BLD AUTO: 7.96 10*3/MM3 (ref 3.4–10.8)

## 2024-03-05 ENCOUNTER — TELEPHONE (OUTPATIENT)
Age: 74
End: 2024-03-05
Payer: MEDICARE

## 2024-03-05 NOTE — TELEPHONE ENCOUNTER
Called and spoke with patient and wife. We had talked about following up in 1 week to make sure that things were improving in regards to hemoptysis and shortness of breath. Patient would prefer to not follow up in person this week. Has another doctors appt today and supposed to go out of town. Spoke with them over the phone in regards to symptoms and sounds like things are much better. Completed Prednisone, finished last day of active antibiotic pill for Azithromycin (day 5). Continues to take the Augmentin. We discussed that the Fuentes will continue to work for 5 additional days. He continues to hold his ASA. We planned to resume the ASA after 7 days which would be this coming weekend. Hemoptysis significantly slowed on Sunday and stopped on Monday, 3/4/24. None today. He still has white to yellow sputum. Getting energy back. Shortness of breath is back to baseline. No longer using Albuterol neb as does not feel like he needs them. We discussed that if he has return of pulmonary sx or any blood for them to contact me. We also discussed that we would follow up in 4-6 weeks and repeat CXR to make sure the LLL cleared given his original symptoms and presentation with hemoptysis. They will call to schedule appt.

## 2024-03-25 ENCOUNTER — HOSPITAL ENCOUNTER (OUTPATIENT)
Dept: CARDIOLOGY | Facility: HOSPITAL | Age: 74
Discharge: HOME | End: 2024-03-25
Payer: MEDICARE

## 2024-03-25 DIAGNOSIS — R06.09 DYSPNEA ON EXERTION: ICD-10-CM

## 2024-03-25 LAB
BH CV ECHO MEAS - AO MAX PG: 5.5 MMHG
BH CV ECHO MEAS - AO MEAN PG: 3 MMHG
BH CV ECHO MEAS - AO ROOT DIAM: 3.2 CM
BH CV ECHO MEAS - AO V2 MAX: 117 CM/SEC
BH CV ECHO MEAS - AO V2 VTI: 25.9 CM
BH CV ECHO MEAS - AVA(I,D): 2.21 CM2
BH CV ECHO MEAS - EDV(CUBED): 110.6 ML
BH CV ECHO MEAS - EDV(MOD-SP2): 86.9 ML
BH CV ECHO MEAS - EDV(MOD-SP4): 91.9 ML
BH CV ECHO MEAS - EF(MOD-BP): 53.2 %
BH CV ECHO MEAS - EF(MOD-SP2): 47.2 %
BH CV ECHO MEAS - EF(MOD-SP4): 55.1 %
BH CV ECHO MEAS - ESV(CUBED): 59.3 ML
BH CV ECHO MEAS - ESV(MOD-SP2): 45.9 ML
BH CV ECHO MEAS - ESV(MOD-SP4): 41.3 ML
BH CV ECHO MEAS - FS: 18.8 %
BH CV ECHO MEAS - IVS/LVPW: 1 CM
BH CV ECHO MEAS - IVSD: 0.8 CM
BH CV ECHO MEAS - LA DIMENSION: 3.6 CM
BH CV ECHO MEAS - LAT PEAK E' VEL: 10.7 CM/SEC
BH CV ECHO MEAS - LV DIASTOLIC VOL/BSA (35-75): 44.9 CM2
BH CV ECHO MEAS - LV MASS(C)D: 126.7 GRAMS
BH CV ECHO MEAS - LV MAX PG: 3.1 MMHG
BH CV ECHO MEAS - LV MEAN PG: 1 MMHG
BH CV ECHO MEAS - LV SYSTOLIC VOL/BSA (12-30): 20.2 CM2
BH CV ECHO MEAS - LV V1 MAX: 87.5 CM/SEC
BH CV ECHO MEAS - LV V1 VTI: 18.2 CM
BH CV ECHO MEAS - LVIDD: 4.8 CM
BH CV ECHO MEAS - LVIDS: 3.9 CM
BH CV ECHO MEAS - LVOT AREA: 3.1 CM2
BH CV ECHO MEAS - LVOT DIAM: 2 CM
BH CV ECHO MEAS - LVPWD: 0.8 CM
BH CV ECHO MEAS - MED PEAK E' VEL: 5.9 CM/SEC
BH CV ECHO MEAS - MV A MAX VEL: 75.5 CM/SEC
BH CV ECHO MEAS - MV DEC SLOPE: 192 CM/SEC2
BH CV ECHO MEAS - MV DEC TIME: 0.36 SEC
BH CV ECHO MEAS - MV E MAX VEL: 51.9 CM/SEC
BH CV ECHO MEAS - MV E/A: 0.69
BH CV ECHO MEAS - MV MAX PG: 3.3 MMHG
BH CV ECHO MEAS - MV MEAN PG: 1 MMHG
BH CV ECHO MEAS - MV P1/2T: 108 MSEC
BH CV ECHO MEAS - MV V2 VTI: 21.5 CM
BH CV ECHO MEAS - MVA(P1/2T): 2.04 CM2
BH CV ECHO MEAS - MVA(VTI): 2.7 CM2
BH CV ECHO MEAS - PA ACC TIME: 0.12 SEC
BH CV ECHO MEAS - SI(MOD-SP2): 20 ML/M2
BH CV ECHO MEAS - SI(MOD-SP4): 24.7 ML/M2
BH CV ECHO MEAS - SV(LVOT): 57.2 ML
BH CV ECHO MEAS - SV(MOD-SP2): 41 ML
BH CV ECHO MEAS - SV(MOD-SP4): 50.6 ML
BH CV ECHO MEAS - TAPSE (>1.6): 2.41 CM
BH CV ECHO MEASUREMENTS AVERAGE E/E' RATIO: 6.25
BH CV REST NUCLEAR ISOTOPE DOSE: 9.3 MCI
BH CV STRESS BP STAGE 1: NORMAL
BH CV STRESS BP STAGE 3: NORMAL
BH CV STRESS COMMENTS STAGE 1: NORMAL
BH CV STRESS DOSE REGADENOSON STAGE 1: 0.4
BH CV STRESS DURATION MIN STAGE 1: 1
BH CV STRESS DURATION MIN STAGE 2: 1
BH CV STRESS DURATION MIN STAGE 3: 1
BH CV STRESS DURATION MIN STAGE 4: 1
BH CV STRESS DURATION SEC STAGE 1: 0
BH CV STRESS DURATION SEC STAGE 2: 0
BH CV STRESS DURATION SEC STAGE 3: 0
BH CV STRESS DURATION SEC STAGE 4: 0
BH CV STRESS HR STAGE 1: 71
BH CV STRESS HR STAGE 2: 83
BH CV STRESS HR STAGE 3: 85
BH CV STRESS HR STAGE 4: 75
BH CV STRESS NUCLEAR ISOTOPE DOSE: 32.8 MCI
BH CV STRESS O2 STAGE 1: 99
BH CV STRESS O2 STAGE 2: 100
BH CV STRESS O2 STAGE 3: 100
BH CV STRESS O2 STAGE 4: 100
BH CV STRESS PROTOCOL 1: NORMAL
BH CV STRESS RECOVERY BP: NORMAL MMHG
BH CV STRESS RECOVERY HR: 69 BPM
BH CV STRESS RECOVERY O2: 100 %
BH CV STRESS STAGE 1: 1
BH CV STRESS STAGE 2: 2
BH CV STRESS STAGE 3: 3
BH CV STRESS STAGE 4: 4
BH CV XLRA - RV BASE: 4.1 CM
BH CV XLRA - RV LENGTH: 6.3 CM
BH CV XLRA - RV MID: 3.3 CM
BH CV XLRA - TDI S': 7.8 CM/SEC
LEFT ATRIUM VOLUME INDEX: 23.1 ML/M2
LV EF NUC BP: 60 %
MAXIMAL PREDICTED HEART RATE: 149 BPM
PERCENT MAX PREDICTED HR: 60.4 %
STRESS BASELINE BP: NORMAL MMHG
STRESS BASELINE HR: 58 BPM
STRESS O2 SAT REST: 98 %
STRESS PERCENT HR: 71 %
STRESS POST ESTIMATED WORKLOAD: 1 METS
STRESS POST EXERCISE DUR MIN: 4 MIN
STRESS POST EXERCISE DUR SEC: 0 SEC
STRESS POST O2 SAT PEAK: 100 %
STRESS POST PEAK BP: NORMAL MMHG
STRESS POST PEAK HR: 90 BPM
STRESS TARGET HR: 127 BPM

## 2024-03-25 PROCEDURE — 93018 CV STRESS TEST I&R ONLY: CPT | Performed by: INTERNAL MEDICINE

## 2024-03-25 PROCEDURE — 0 TECHNETIUM SESTAMIBI: Performed by: INTERNAL MEDICINE

## 2024-03-25 PROCEDURE — 93306 TTE W/DOPPLER COMPLETE: CPT

## 2024-03-25 PROCEDURE — 78452 HT MUSCLE IMAGE SPECT MULT: CPT | Performed by: INTERNAL MEDICINE

## 2024-03-25 PROCEDURE — 78452 HT MUSCLE IMAGE SPECT MULT: CPT

## 2024-03-25 PROCEDURE — 25010000002 REGADENOSON 0.4 MG/5ML SOLUTION: Performed by: INTERNAL MEDICINE

## 2024-03-25 PROCEDURE — A9500 TC99M SESTAMIBI: HCPCS | Performed by: INTERNAL MEDICINE

## 2024-03-25 PROCEDURE — 93306 TTE W/DOPPLER COMPLETE: CPT | Performed by: INTERNAL MEDICINE

## 2024-03-25 PROCEDURE — 93017 CV STRESS TEST TRACING ONLY: CPT

## 2024-03-25 RX ORDER — REGADENOSON 0.08 MG/ML
0.4 INJECTION, SOLUTION INTRAVENOUS ONCE
Status: COMPLETED | OUTPATIENT
Start: 2024-03-25 | End: 2024-03-25

## 2024-03-25 RX ADMIN — TECHNETIUM TC 99M SESTAMIBI 1 DOSE: 1 INJECTION INTRAVENOUS at 12:20

## 2024-03-25 RX ADMIN — REGADENOSON 0.4 MG: 0.08 INJECTION, SOLUTION INTRAVENOUS at 13:48

## 2024-03-25 RX ADMIN — TECHNETIUM TC 99M SESTAMIBI 1 DOSE: 1 INJECTION INTRAVENOUS at 13:50

## 2024-03-26 ENCOUNTER — TELEPHONE (OUTPATIENT)
Dept: CARDIOLOGY | Facility: CLINIC | Age: 74
End: 2024-03-26
Payer: MEDICARE

## 2024-03-26 NOTE — TELEPHONE ENCOUNTER
----- Message from Anthony Riley III, MD sent at 3/26/2024  9:25 AM EDT -----  Strength of heart muscle is normal and there was no evidence for any new blockages.

## 2024-03-26 NOTE — TELEPHONE ENCOUNTER
Called patient. Unavailable.Spoke to Spouse. Notified of message above from . Verbalized understanding.

## 2024-04-02 ENCOUNTER — OFFICE VISIT (OUTPATIENT)
Age: 74
End: 2024-04-02
Payer: MEDICARE

## 2024-04-02 VITALS
OXYGEN SATURATION: 97 % | HEART RATE: 71 BPM | BODY MASS INDEX: 24.28 KG/M2 | TEMPERATURE: 97.8 F | SYSTOLIC BLOOD PRESSURE: 136 MMHG | DIASTOLIC BLOOD PRESSURE: 80 MMHG | WEIGHT: 184 LBS

## 2024-04-02 DIAGNOSIS — I10 BENIGN HYPERTENSION: ICD-10-CM

## 2024-04-02 DIAGNOSIS — R04.2 HEMOPTYSIS: ICD-10-CM

## 2024-04-02 DIAGNOSIS — K21.00 GASTROESOPHAGEAL REFLUX DISEASE WITH ESOPHAGITIS WITHOUT HEMORRHAGE: ICD-10-CM

## 2024-04-02 DIAGNOSIS — I25.10 CORONARY ARTERY DISEASE INVOLVING NATIVE CORONARY ARTERY OF NATIVE HEART WITHOUT ANGINA PECTORIS: ICD-10-CM

## 2024-04-02 DIAGNOSIS — M05.79 RHEUMATOID ARTHRITIS INVOLVING MULTIPLE SITES WITH POSITIVE RHEUMATOID FACTOR: ICD-10-CM

## 2024-04-02 DIAGNOSIS — J44.9 COPD MIXED TYPE: ICD-10-CM

## 2024-04-02 DIAGNOSIS — J18.9 PNEUMONIA OF LEFT LOWER LOBE DUE TO INFECTIOUS ORGANISM: Primary | ICD-10-CM

## 2024-04-02 DIAGNOSIS — E78.00 HYPERCHOLESTEROLEMIA: ICD-10-CM

## 2024-04-02 DIAGNOSIS — R93.89 ABNORMAL CHEST CT: ICD-10-CM

## 2024-04-02 PROCEDURE — 3075F SYST BP GE 130 - 139MM HG: CPT | Performed by: INTERNAL MEDICINE

## 2024-04-02 PROCEDURE — 3079F DIAST BP 80-89 MM HG: CPT | Performed by: INTERNAL MEDICINE

## 2024-04-02 PROCEDURE — 99214 OFFICE O/P EST MOD 30 MIN: CPT | Performed by: INTERNAL MEDICINE

## 2024-04-02 NOTE — PROGRESS NOTES
"Progress Note    Subjective      Boo \"Iam\" is a 73 y.o. male.    Chief Complaint   Patient presents with    Cough     Improving has not seen any blood        HPI  Hemoptysis  LLL PNA  Moderate Stage II COPD    Today:  - doing well  - still coughing but SOA and cough at baseline  - sputum is clear white unless he smokes and then brown, yellow  - no hemoptysis since about 5-6 days into the abx for PNA  - completed Azith AND Augmentin  - continues on Trelegy  - no Albuterol use since PNA    Last Visit:  - started on Monday  - put down hardwood that day, exposed to a lot of dust and material  - started having hemoptysis about a tablespoon in amount with each cough, coughing numerous times per day  - he says that it is always mixed with mucous, today what was spit up was a little yellow/green tinged  - cough is more than baseline  - increased shortness of breath, using Duo Neb daily  - white in appearance normal, but in office some yellow/green tinge but new blood and that its different  - does not look like old blood, typically a bright red appearance  - no fever  - some chills and fatigued  - no sick contacts  - both sides of chest hurt up high  - a little more on the left than right  - only hurts when cough  - no pain with breathing  - started Duo nebs on Tuesday, using once a day  - Trelegy continued  - breathing worse getting out of shower is worse and that started before working with hardwood   - always feels stopped up but not worse than normal  - left nostril has some blood tinged snot but not of same brightness and not running down throat  - no history of clots  - no CHF     CAD  - follows with Dr. Riley  - PCI in 2012  - on ASA  - on HTN meds, cholesterol meds  - last heart cath in 2016  - stress test and ECHO completed due to SOA - no acute ischemia on stress, ECHO with normal function     HTN  - Amlodipine and Valsartan  - controls things well     GERD  - Nexium controls GERD, does not take every day   "   HPL  - Crestor     Rheumatoid Arthritis  - Kevzara  - Follows with [facility] - seen today, no changes were made     BPH  - not on medication  - no obstructive symtpoms     Tobacco Abuse  - continues to smoke  - 100 pack year history     Moderate Stage 2 COPD  - Trelegy  - Follows with Pulm, Dr. Turner     Pulju Nodule  - following a 6mm nodule  - recent CT scan 1/2024, planning for 6 month scan     No history of DM2  Past Medical History:  Patient Active Problem List   Diagnosis    CAD (coronary artery disease)    Benign hypertension    Hypercholesterolemia    Tobacco abuse    Family history of heart disease    Positive cardiac stress test    Benign prostatic hyperplasia    GERD (gastroesophageal reflux disease)    [place] (hard of hearing)    Neck pain    Rheumatoid arthritis involving multiple sites with positive RF (On Kevzara)    Abnormal chest CT (Pulmonary nodules)    Moderate (Stage II) COPD    Hemoptysis    Pneumonia of left lower lobe due to infectious organism       Medications:  Current Outpatient Medications on File Prior to Visit   Medication Sig Dispense Refill    albuterol sulfate  (90 Base) MCG/ACT inhaler Inhale 2 puffs Every 4 (Four) Hours As Needed for Wheezing or Shortness of Air. 18 g 11    amLODIPine-valsartan (EXFORGE) 5-320 MG per tablet Take 1 tablet by mouth Daily. 90 tablet 4    aspirin 81 MG EC tablet Take 1 tablet by mouth Every Night.      esomeprazole (nexIUM) 40 MG capsule Take 1 capsule by mouth Daily. 30 capsule 11    Fluticasone-Umeclidin-Vilant (Trelegy Ellipta) 200-62.5-25 MCG/ACT aerosol powder  Inhale 1 puff Daily. 1 each 11    Multiple Vitamins-Minerals ([facility] SENIOR PO) Take  by mouth Daily.      multivitamin (THERAGRAN) tablet tablet Multi Vitamin      rosuvastatin (CRESTOR) 40 MG tablet Take 1 tablet by mouth Daily. 90 tablet 4    Sarilumab (Kevzara) 200 MG/1.14ML solution prefilled syringe Inject 1 pen under the skin into the appropriate area as directed Every 14  (Fourteen) Days.      amoxicillin-clavulanate (AUGMENTIN) 875-125 MG per tablet Take 1 tablet by mouth 2 (Two) Times a Day. 20 tablet 0    ipratropium-albuterol (DUO-NEB) 0.5-2.5 mg/3 ml nebulizer  (Patient not taking: Reported on 2/29/2024)      predniSONE (DELTASONE) 20 MG tablet Take 2 tablets by mouth Daily. 10 tablet 0     No current facility-administered medications on file prior to visit.       Allergies:   No Known Allergies    Immunizations:  Immunization History   Administered Date(s) Administered    COVID-19 (MODERNA) 1st,2nd,3rd Dose Monovalent 03/04/2021, 04/09/2021, 01/05/2022    Flublock Quad =>18yrs 12/10/2019    Fluzone High Dose =>65 Years ([facility] ONLY) 10/22/2021    Fluzone High-Dose 65+yrs 12/07/2022    Influenza recombinant 12/10/2019    Pneumococcal Conjugate 13-Valent (PCV13) 06/05/2017    Shingrix 01/10/2020    Tdap 09/09/2016        Family History:  Family History   Problem Relation Age of Onset    No Known Problems Mother     No Known Problems Father     Asthma Brother     Colon cancer Neg Hx     Colon polyps Neg Hx     Esophageal cancer Neg Hx        Social History:  Social History     Socioeconomic History    Marital status:    Tobacco Use    Smoking status: Every Day     Current packs/day: 1.00     Average packs/day: 1 pack/day for 58.3 years (58.3 ttl pk-yrs)     Types: Cigarettes     Start date: 1/1/1966     Passive exposure: Current    Smokeless tobacco: Never   Vaping Use    Vaping status: Never Used   Substance and Sexual Activity    Alcohol use: No    Drug use: No    Sexual activity: Defer       Objective   /80   Pulse 71   Temp 97.8 °F (36.6 °C)   Wt 83.5 kg (184 lb)   SpO2 97%   BMI 24.28 kg/m²     BMI is within normal parameters. No other follow-up for BMI required.       Physical Exam  Vitals reviewed.   Constitutional:       General: He is not in acute distress.     Appearance: He is normal weight.   HENT:      Mouth/Throat:      Mouth: Mucous membranes are  moist.   Eyes:      Conjunctiva/sclera: Conjunctivae normal.   Cardiovascular:      Rate and Rhythm: Normal rate and regular rhythm.      Heart sounds: Normal heart sounds. No murmur heard.  Pulmonary:      Effort: Pulmonary effort is normal. No respiratory distress.      Comments: BS CTAB with exception of LLL where an initial wheeze and some end exp crackles were heard after deep breathing, cleared and BS normal  Abdominal:      General: Abdomen is flat. Bowel sounds are normal. There is no distension.      Palpations: Abdomen is soft.      Tenderness: There is no abdominal tenderness.   Skin:     General: Skin is warm and dry.   Neurological:      Mental Status: He is alert.         Assessment & Plan     Hemoptysis - resolved  LLL PNA - clinically resolved  COPD Exacerbation - clinically resolved  Moderate Stage II COPD  - last visit presented with approx 15ml bright red blood, numerous times per day with coughing for 3 days, this continued about 5 days into abx for PNA  - last visit CBC, CMP, PT/PTT unrevealing  - last visit rapid Flu, COVID negative in the office today  - obtained CTPE last visit and showed new LLL PNA and no clot, stable nodules as compared to the 1/2024 CT noncon study  - finished Prednisone, Azith and Augmentin, with resolution of systemic sx, resolution of hemoptysis, feels back to baseline for resp status  - resumed baby ASA after held for 1 week during hemoptysis  - will continue Duo Nebs as needed  - will continue Trelegy  - will obtain CXR now 4-6 weeks from PNA to document resolution radiographically given significant hemoptysis during initial presentation     Pulmonary Nodules  - in the summer had new 6mm nodule  - Pulm following, last CT chest 1/2024  - next CT chest planned 7/2024    CAD  - follows with Dr. Riley  - PCI in 2012  - on ASA  - on HTN meds, cholesterol meds  - last heart cath in 2016  - ECHO 3/3024 - reviewed today    Left ventricular systolic function is low normal.  Calculated left ventricular EF = 53.2% Left ventricular ejection fraction appears to be 51 - 55%.    Left ventricular diastolic function is consistent with (grade I) impaired relaxation.    Estimated right ventricular systolic pressure from tricuspid regurgitation is normal (<35 mmHg).    - Stress 3/2024- reviewed today    Rate dependent left bundle branch block.    Left ventricular ejection fraction is normal (Calculated EF = 60%).    Prominent apical thinning artifact, cannot exclude small to moderate sized apical infarct.  No ischemia visualized.    Impressions are consistent with an intermediate risk study.     RA  - previously on Humira, now on Kevzara  - followed by  Rheum, seen 4/2/24     GERD  - encouraged to take Nexium daily      Tobacco Abuse  - continues to smoke, encouraged cessation     HTN  - well controlled  - continue Amlodipine and Valsartan     HPL  - continue Crestor  - Lipids last checked 2/2024     BPH  - no obstructive sx  - not currently on medication    HCM  - completed Cscope 2029 at [facility]  - will check on PNA vaccines  - signed EDUARDO for [facility]  - will call to schedule annual visit     FU for medicare wellness    Kindra White MD, FAAP, FACP  Internal Medicine and Pediatrics  [facility]

## 2024-05-15 RX ORDER — ROSUVASTATIN CALCIUM 40 MG/1
40 TABLET, COATED ORAL DAILY
Qty: 90 TABLET | Refills: 3 | Status: SHIPPED | OUTPATIENT
Start: 2024-05-15

## 2024-05-15 RX ORDER — AMLODIPINE AND VALSARTAN 5; 320 MG/1; MG/1
1 TABLET ORAL DAILY
Qty: 90 TABLET | Refills: 3 | Status: SHIPPED | OUTPATIENT
Start: 2024-05-15

## 2024-07-09 ENCOUNTER — HOSPITAL ENCOUNTER (OUTPATIENT)
Dept: CT IMAGING | Facility: HOSPITAL | Age: 74
Discharge: HOME | End: 2024-07-09
Admitting: INTERNAL MEDICINE
Payer: MEDICARE

## 2024-07-09 DIAGNOSIS — R91.1 LUNG NODULE: ICD-10-CM

## 2024-07-09 PROCEDURE — 71250 CT THORAX DX C-: CPT

## 2024-07-11 ENCOUNTER — DOCUMENTATION (OUTPATIENT)
Dept: PULMONOLOGY | Facility: CLINIC | Age: 74
End: 2024-07-11
Payer: MEDICARE

## 2024-07-11 NOTE — PROGRESS NOTES
Patient underwent a CT scan of the chest which revealed stability in the previously noted pulmonary nodules    I discussed these results with the patient and his wife on the phone.  I have encouraged him to keep his August appointment at which time we will decide when next chest imaging is needed.

## 2024-08-14 ENCOUNTER — OFFICE VISIT (OUTPATIENT)
Dept: PULMONOLOGY | Facility: CLINIC | Age: 74
End: 2024-08-14
Payer: MEDICARE

## 2024-08-14 VITALS
WEIGHT: 171 LBS | SYSTOLIC BLOOD PRESSURE: 142 MMHG | DIASTOLIC BLOOD PRESSURE: 80 MMHG | BODY MASS INDEX: 22.66 KG/M2 | HEIGHT: 73 IN | OXYGEN SATURATION: 97 % | HEART RATE: 57 BPM | TEMPERATURE: 97 F

## 2024-08-14 DIAGNOSIS — R93.89 ABNORMAL CHEST CT: ICD-10-CM

## 2024-08-14 DIAGNOSIS — M05.79 RHEUMATOID ARTHRITIS INVOLVING MULTIPLE SITES WITH POSITIVE RHEUMATOID FACTOR: ICD-10-CM

## 2024-08-14 DIAGNOSIS — R04.2 HEMOPTYSIS: ICD-10-CM

## 2024-08-14 DIAGNOSIS — J44.9 COPD MIXED TYPE: ICD-10-CM

## 2024-08-14 DIAGNOSIS — Z72.0 TOBACCO ABUSE: ICD-10-CM

## 2024-08-14 DIAGNOSIS — Z23 IMMUNIZATION DUE: Primary | ICD-10-CM

## 2024-08-14 PROCEDURE — 94726 PLETHYSMOGRAPHY LUNG VOLUMES: CPT | Performed by: INTERNAL MEDICINE

## 2024-08-14 PROCEDURE — 99214 OFFICE O/P EST MOD 30 MIN: CPT | Performed by: INTERNAL MEDICINE

## 2024-08-14 PROCEDURE — 3079F DIAST BP 80-89 MM HG: CPT | Performed by: INTERNAL MEDICINE

## 2024-08-14 PROCEDURE — G0009 ADMIN PNEUMOCOCCAL VACCINE: HCPCS | Performed by: INTERNAL MEDICINE

## 2024-08-14 PROCEDURE — 1160F RVW MEDS BY RX/DR IN RCRD: CPT | Performed by: INTERNAL MEDICINE

## 2024-08-14 PROCEDURE — 1159F MED LIST DOCD IN RCRD: CPT | Performed by: INTERNAL MEDICINE

## 2024-08-14 PROCEDURE — 3077F SYST BP >= 140 MM HG: CPT | Performed by: INTERNAL MEDICINE

## 2024-08-14 PROCEDURE — 90677 PCV20 VACCINE IM: CPT | Performed by: INTERNAL MEDICINE

## 2024-08-14 PROCEDURE — 94729 DIFFUSING CAPACITY: CPT | Performed by: INTERNAL MEDICINE

## 2024-08-14 PROCEDURE — 94010 BREATHING CAPACITY TEST: CPT | Performed by: INTERNAL MEDICINE

## 2024-08-14 RX ORDER — FLUTICASONE FUROATE, UMECLIDINIUM BROMIDE AND VILANTEROL TRIFENATATE 200; 62.5; 25 UG/1; UG/1; UG/1
1 POWDER RESPIRATORY (INHALATION) DAILY
Qty: 1 EACH | Refills: 11 | Status: SHIPPED | OUTPATIENT
Start: 2024-08-14

## 2024-08-14 RX ORDER — ALBUTEROL SULFATE 90 UG/1
2 AEROSOL, METERED RESPIRATORY (INHALATION) EVERY 4 HOURS PRN
Qty: 18 G | Refills: 11 | Status: SHIPPED | OUTPATIENT
Start: 2024-08-14

## 2024-08-14 NOTE — LETTER
August 14, 2024       No Recipients    Patient: Boo White   YOB: 1950   Date of Visit: 8/14/2024     Dear Dr. Diaz Recipients:    Thank you for referring Boo White to me for evaluation. Below are the relevant portions of my assessment and plan of care.    If you have questions, please do not hesitate to call me. I look forward to following Boo along with you.         Sincerely,        Anthony Turner MD        CC:   No Recipients      Progress Notes:  No notes on file

## 2024-08-14 NOTE — LETTER
[facility]  Vaccine Consent Form    Patient Name:  Boo White  Patient :  1950        Screening Checklist  The following questions should be completed prior to vaccination. If you answer “yes” to any question, it does not necessarily mean you should not be vaccinated. It just means we may need to clarify or ask more questions. If a question is unclear, please ask your healthcare provider to explain it.    Yes No   Any fever or moderate to severe illness today (mild illness and/or antibiotic treatment are not contraindications)?     Do you have a history of a serious reaction to any previous vaccinations, such as anaphylaxis, encephalopathy within 7 days, Guillain-Buford syndrome within 6 weeks, seizure?     Have you received any live vaccine(s) (e.g MMR, URIEL) or any other vaccines in the last month (to ensure duplicate doses aren't given)?     Do you have an anaphylactic allergy to latex (DTaP, DTaP-IPV, Hep A, Hep B, MenB, RV, Td, Tdap), baker’s yeast (Hep B, HPV), polysorbates (RSV, nirsevimab, PCV 20, Rotavirrus, Tdap, Shingrix), or gelatin (URIEL, MMR)?     Do you have an anaphylactic allergy to neomycin (Rabies, URIEL, MMR, IPV, Hep A), polymyxin B (IPV), or streptomycin (IPV)?      Any cancer, leukemia, AIDS, or other immune system disorder? (URIEL, MMR, RV)     Do you have a parent, brother, or sister with an immune system problem (if immune competence of vaccine recipient clinically verified, can proceed)? (MMR, URIEL)     Any recent steroid treatments for >2 weeks, chemotherapy, or radiation treatment? (URIEL, MMR)     Have you received antibody-containing blood transfusions or IVIG in the past 11 months (recommended interval is dependent on product)? (MMR, URIEL)     Have you taken antiviral drugs (acyclovir, famciclovir, valacyclovir for URIEL) in the last 24 or 48 hours, respectively?      Are you pregnant or planning to become pregnant within 1 month? (URIEL, MMR, HPV, IPV, MenB, Abrexvy; For Hep B-  "refer to Engerix-B; For RSV - Abrysvo is indicated for 32-36 weeks of pregnancy from September to January)     For infants, have you ever been told your child has had intussusception or a medical emergency involving obstruction of the intestine (Rotavirus)? If not for an infant, can skip this question.         *Ordering Physicians/APC should be consulted if \"yes\" is checked by the patient or guardian above.  I have received, read, and understand the Vaccine Information Statement (VIS) for each vaccine ordered.  I have considered my or my child's health status as well as the health status of my close contacts.  I have taken the opportunity to discuss my vaccine questions with my or my child's health care provider.   I have requested that the ordered vaccine(s) be given to me or my child.  I understand the benefits and risks of the vaccines.  I understand that I should remain in the clinic for 15 minutes after receiving the vaccine(s).  _________________________________________________________  Signature of Patient or Parent/Legal Guardian ____________________  Date     "

## 2024-08-14 NOTE — PROGRESS NOTES
PULMONARY  NOTE    Chief Complaint     Moderate COPD, tobacco abuse, rheumatoid arthritis, abnormal chest imaging    History of Present Illness     74-year-old male returns today for follow-up  I last saw him 1/10/2024    He has ongoing tobacco abuse with no plans for smoking cessation    He has moderate, stage II, chronic obstructive pulmonary disease  He remains on Trelegy with albuterol  No recent acute exacerbation    He has rheumatoid arthritis, previously on Humira  Currently on Kevzara    Past chest imaging revealed a new 6 mm pulmonary nodule which on follow-up CT scan of the chest was not evident with no new intrathoracic findings    Patient Active Problem List   Diagnosis    CAD (coronary artery disease)    Benign hypertension    Hypercholesterolemia    Tobacco abuse    Family history of heart disease    Positive cardiac stress test    Benign prostatic hyperplasia    GERD (gastroesophageal reflux disease)    [place] (hard of hearing)    Neck pain    Rheumatoid arthritis involving multiple sites with positive RF (On Kevzara)    Abnormal chest CT (Pulmonary nodules)    Moderate (Stage II) COPD    Hemoptysis (Resolved)    Pneumonia of left lower lobe due to infectious organism      No Known Allergies    Current Outpatient Medications:     albuterol sulfate  (90 Base) MCG/ACT inhaler, Inhale 2 puffs Every 4 (Four) Hours As Needed for Wheezing or Shortness of Air., Disp: 18 g, Rfl: 11    amLODIPine-valsartan (EXFORGE) 5-320 MG per tablet, Take 1 tablet by mouth Daily., Disp: 90 tablet, Rfl: 3    aspirin 81 MG EC tablet, Take 1 tablet by mouth Every Night., Disp: , Rfl:     esomeprazole (nexIUM) 40 MG capsule, Take 1 capsule by mouth Daily., Disp: 30 capsule, Rfl: 11    Fluticasone-Umeclidin-Vilant (Trelegy Ellipta) 200-62.5-25 MCG/ACT aerosol powder , Inhale 1 puff Daily., Disp: 1 each, Rfl: 11    ipratropium-albuterol (DUO-NEB) 0.5-2.5 mg/3 ml nebulizer, , Disp: , Rfl:     Multiple Vitamins-Minerals  "([facility] SENIOR PO), Take  by mouth Daily., Disp: , Rfl:     multivitamin (THERAGRAN) tablet tablet, Multi Vitamin, Disp: , Rfl:     rosuvastatin (CRESTOR) 40 MG tablet, Take 1 tablet by mouth Daily., Disp: 90 tablet, Rfl: 3    Sarilumab (Kevzara) 200 MG/1.14ML solution prefilled syringe, Inject 1 pen under the skin into the appropriate area as directed Every 14 (Fourteen) Days., Disp: , Rfl:   MEDICATION LIST AND ALLERGIES REVIEWED.    Family History   Problem Relation Age of Onset    No Known Problems Mother     No Known Problems Father     Asthma Brother     Colon cancer Neg Hx     Colon polyps Neg Hx     Esophageal cancer Neg Hx      Social History     Tobacco Use    Smoking status: Every Day     Current packs/day: 1.00     Average packs/day: 1 pack/day for 58.6 years (58.6 ttl pk-yrs)     Types: Cigarettes     Start date: 1/1/1966     Passive exposure: Current    Smokeless tobacco: Never   Vaping Use    Vaping status: Never Used   Substance Use Topics    Alcohol use: No    Drug use: No     Social History     Social History Narrative    Not on file     FAMILY AND SOCIAL HISTORY REVIEWED.    Review of Systems  IF PRESENT REFER TO SCANNED ROS SHEET FROM SAME DATE  OTHERWISE ROS OBTAINED AND NON-CONTRIBUTORY OVER HPI.    /80   Pulse 57   Temp 97 °F (36.1 °C) (Infrared)   Ht 185.4 cm (72.99\")   Wt 77.6 kg (171 lb)   SpO2 97% Comment: room air at rest  BMI 22.57 kg/m²   Physical Exam  Vitals and nursing note reviewed.   Constitutional:       General: He is not in acute distress.     Appearance: He is well-developed. He is not diaphoretic.   HENT:      Head: Normocephalic and atraumatic.   Neck:      Thyroid: No thyromegaly.   Cardiovascular:      Rate and Rhythm: Normal rate and regular rhythm.      Heart sounds: Normal heart sounds. No murmur heard.  Pulmonary:      Effort: Pulmonary effort is normal.      Breath sounds: Normal breath sounds. No stridor.   Lymphadenopathy:      Cervical: No cervical " adenopathy.      Upper Body:      Right upper body: No supraclavicular or epitrochlear adenopathy.      Left upper body: No supraclavicular or epitrochlear adenopathy.   Skin:     General: Skin is warm and dry.   Neurological:      Mental Status: He is alert and oriented to person, place, and time.   Psychiatric:         Behavior: Behavior normal.         Results     PFTs continue to show moderate airway obstruction, no restriction, and a reduced diffusion capacity, roughly stable in comparison to prior PFTs    CT scan of the chest from July 2024 revealed stability and previously noted pleural and parenchymal densities with no new or progressive intrathoracic findings    Immunization History   Administered Date(s) Administered    COVID-19 (MODERNA) 1st,2nd,3rd Dose Monovalent 03/04/2021, 04/09/2021, 01/05/2022    Flublock Quad =>18yrs 12/10/2019    Fluzone High-Dose 65+YRS 10/22/2021    Fluzone High-Dose 65+yrs 12/07/2022    Influenza recombinant 12/10/2019    Pneumococcal Conjugate 13-Valent (PCV13) 06/05/2017    Shingrix 01/10/2020    Tdap 09/09/2016, 05/20/2024     Problem List       ICD-10-CM ICD-9-CM   1. Immunization due  Z23 V05.9   2. Abnormal chest CT (Pulmonary nodules)  R93.89 793.2   3. Moderate (Stage II) COPD  J44.9 496   4. Tobacco abuse  Z72.0 305.1   5. Rheumatoid arthritis involving multiple sites with positive RF (On Kevzara)  M05.79 714.0   6. Hemoptysis (Resolved)  R04.2 786.30       Discussion     We reviewed his PFTs and chest imaging  Nothing suspicious on his most recent CT scan of the chest  No evidence of autoimmune related ILD  Have recommended repeat chest imaging in 1 year for lung cancer screening and also for ILD surveillance    We discussed smoking cessation  He has no plans for smoking cessation at this time    He is can remain on Trelegy with albuterol  I will submit refills    At the very least we will plan to get annual PFTs as well for ILD surveillance given his history of  rheumatoid arthritis    Moderate level of Medical Decision Making complexity based on 2 or more chronic stable illnesses and an independent review of test results and/or prescription drug management.    Anthony Turner MD  Note electronically signed    CC: Kindra White MD

## 2024-08-15 DIAGNOSIS — R93.89 ABNORMAL CHEST CT: Primary | ICD-10-CM

## 2024-09-06 ENCOUNTER — PATIENT MESSAGE (OUTPATIENT)
Age: 74
End: 2024-09-06
Payer: MEDICARE

## 2025-01-28 ENCOUNTER — TELEPHONE (OUTPATIENT)
Dept: CARDIOLOGY | Facility: CLINIC | Age: 75
End: 2025-01-28

## 2025-01-28 NOTE — TELEPHONE ENCOUNTER
Caller: Jc White    Relationship: Emergency Contact    Best call back number: [phone]     What is the best time to reach you: ANYTIME    Who are you requesting to speak with (clinical staff, provider,  specific staff member): RIZWAN    Do you know the name of the person who called: RIZWAN    What was the call regarding:  PT'S WIFE IS RETURNING A PHONE TO [facility]. THE CALL WAS ABOUT THE PT BEING ON A WAITING LIST. WIFE SAYS PT IS DOING GOOD, AND DOES NOT NEED A SOONER APPOINTMENT AS OF NOW. HE MAY NEED PRESCRIPTION REFILLS BEFORE HIS NEXT VISIT.     Is it okay if the provider responds through MyChart: YES

## 2025-04-17 RX ORDER — AMLODIPINE AND VALSARTAN 5; 320 MG/1; MG/1
TABLET ORAL
Qty: 90 TABLET | Refills: 3 | Status: SHIPPED | OUTPATIENT
Start: 2025-04-17

## 2025-04-29 ENCOUNTER — OFFICE VISIT (OUTPATIENT)
Dept: CARDIOLOGY | Facility: CLINIC | Age: 75
End: 2025-04-29
Payer: MEDICARE

## 2025-04-29 VITALS
OXYGEN SATURATION: 96 % | SYSTOLIC BLOOD PRESSURE: 130 MMHG | HEIGHT: 73 IN | DIASTOLIC BLOOD PRESSURE: 72 MMHG | HEART RATE: 77 BPM | WEIGHT: 177.2 LBS | BODY MASS INDEX: 23.49 KG/M2

## 2025-04-29 DIAGNOSIS — I25.10 CORONARY ARTERY DISEASE INVOLVING NATIVE CORONARY ARTERY OF NATIVE HEART WITHOUT ANGINA PECTORIS: Primary | ICD-10-CM

## 2025-04-29 DIAGNOSIS — E78.00 HYPERCHOLESTEROLEMIA: ICD-10-CM

## 2025-04-29 DIAGNOSIS — I10 BENIGN HYPERTENSION: ICD-10-CM

## 2025-04-29 PROCEDURE — 3075F SYST BP GE 130 - 139MM HG: CPT | Performed by: INTERNAL MEDICINE

## 2025-04-29 PROCEDURE — 99214 OFFICE O/P EST MOD 30 MIN: CPT | Performed by: INTERNAL MEDICINE

## 2025-04-29 PROCEDURE — 93000 ELECTROCARDIOGRAM COMPLETE: CPT | Performed by: INTERNAL MEDICINE

## 2025-04-29 PROCEDURE — 3078F DIAST BP <80 MM HG: CPT | Performed by: INTERNAL MEDICINE

## 2025-04-29 RX ORDER — ROSUVASTATIN CALCIUM 40 MG/1
40 TABLET, COATED ORAL DAILY
Qty: 90 TABLET | Refills: 3 | Status: SHIPPED | OUTPATIENT
Start: 2025-04-29

## 2025-04-29 NOTE — PROGRESS NOTES
[facility] at [facility]  Office visit  Boo White  1950  [phone]  VISIT DATE:  4/29/2025      PCP: Kindra White MD  [address]  [facility] [address]    CC:  Chief Complaint   Patient presents with   • Follow-up     1 year follow up       PROBLEM LIST:  Coronary artery disease with abnormal Cardiolite:  A 6-9 month history of progressive shortness of breath/dyspnea upon exertion.  Abnormal EKG revealing inferior ST downsloping.  Echocardiogram, 05/15/2012: Ejection fraction normal at 60%, mild diastolic dysfunction, trace mitral regurgitation, mild tricuspid regurgitation with an RVSP at 33.  Abnormal Lexiscan Cardiolite: The patient walked 3 minutes with abnormal imaging studies.  Left heart catheterization, 08/30/2012: Status post FFR and drug-eluting stent to the mid and proximal RCA using a 4.6 mm Promus stent proximally and 3 x 32 in the mid vessel and stenting of the left circumflex using a 2.75 x 16 mm Promus drug-eluting stent.  Benign hypertension  Hypercholesterolemia  Non-insulin-dependent type 2, diabetes mellitus  Ongoing tobacco abuse; 2 packs per day  Family history of heart disease  Surgical history:  Right index finger removed secondary to working accident     February 2021 ABIs: Normal    March 2024  TTE  •  Left ventricular systolic function is low normal. Calculated left ventricular EF = 53.2% Left ventricular ejection fraction appears to be 51 - 55%.  •  Left ventricular diastolic function is consistent with (grade I) impaired relaxation.  •  Estimated right ventricular systolic pressure from tricuspid regurgitation is normal (<35 mmHg).  Xochilt scan myocardial perfusion imaging  •  Rate dependent left bundle branch block.  •  Left ventricular ejection fraction is normal (Calculated EF = 60%).  •  Prominent apical thinning artifact, cannot exclude small to moderate sized apical infarct.  No ischemia visualized.  •  Impressions  "are consistent with an intermediate risk study.    ASSESSMENT:   Diagnosis Plan   1. Coronary artery disease involving native coronary artery of native heart without angina pectoris        2. Benign hypertension        3. Hypercholesterolemia              PLAN:  Coronary artery disease:  Continue aspirin, afterload reduction and statin therapy.    Hypertension: Goal less than 130/80 mmHg.  Well-controlled at home.  Continue combination of amlodipine and valsartan    Hyperlipidemia: Goal LDL less than 70.  Continue rosuvastatin 40 mg p.o. daily.      Nicotine abuse: Counseled on need for smoking cessation.  Currently not ready to set a quit date.  Currently smoking a pack per day.  Reports that is the only thing that keeps him calm.      Subjective  COPD.  Stable shortness of breath in a class II pattern with such activities as going up a flights of stairs or walking up an incline carrying something or taking out the trash.  Currently smoking a pack per day, previously smoked as much as 2 packs per day.  Blood pressures running less than 140/90 mmHg.  Denies chest discomfort and palpitations.  He appears compliant with medical therapy.      PHYSICAL EXAMINATION:  Vitals:    04/29/25 1519   BP: 130/72   BP Location: Right arm   Patient Position: Sitting   Cuff Size: Adult   Pulse: 77   SpO2: 96%   Weight: 80.4 kg (177 lb 3.2 oz)   Height: 185.4 cm (72.99\")       General Appearance:    Alert, cooperative, no distress, appears stated age   Head:    Normocephalic, without obvious abnormality, atraumatic   Eyes:    conjunctiva/corneas clear   Nose:   Nares normal, septum midline, mucosa normal, no drainage   Throat:   Lips, teeth and gums normal   Neck:   Supple, symmetrical, trachea midline, no carotid    bruit or JVD   Lungs:     Diminished throughout, respirations unlabored   Chest Wall:    No tenderness or deformity    Heart:    Regular rate and rhythm, S1 and S2 normal, no murmur, rub   or gallop, normal carotid " impulse bilaterally without bruit.   Abdomen:     Soft, non-tender   Extremities:   Extremities normal, atraumatic, no cyanosis or edema   Pulses:  Pedal pulses are difficult to appreciate   Skin:   Skin color, texture, turgor normal, no rashes or lesions       Diagnostic Data:    ECG 12 Lead    Date/Time: 4/29/2025 3:52 PM  Performed by: Anthony Riley III, MD    Authorized by: Anthony Riley III, MD  Comparison: compared with previous ECG from 1/26/2022  Similar to previous ECG  Rhythm: sinus rhythm  Other findings: non-specific ST-T wave changes    Clinical impression: abnormal EKG    Lab Results   Component Value Date    CHLPL 149 04/12/2022    TRIG 68 02/29/2024    HDL 44 02/29/2024     Lab Results   Component Value Date    GLUCOSE 89 02/29/2024    BUN 20 02/29/2024    CREATININE 1.00 02/29/2024     02/29/2024    K 4.1 02/29/2024     (H) 02/29/2024    CO2 24.4 02/29/2024     Lab Results   Component Value Date    HGBA1C 5.20 11/18/2016     Lab Results   Component Value Date    WBC 4.1 04/08/2025    HGB 15.8 04/08/2025    HCT 46.4 04/08/2025     04/08/2025       Allergies  No Known Allergies    Current Medications    Current Outpatient Medications:   •  albuterol sulfate  (90 Base) MCG/ACT inhaler, Inhale 2 puffs Every 4 (Four) Hours As Needed for Wheezing or Shortness of Air., Disp: 18 g, Rfl: 11  •  amLODIPine-valsartan (EXFORGE) 5-320 MG per tablet, TAKE (1) TABLET DAILY FOR HIGH BLOOD PRESSURE, Disp: 90 tablet, Rfl: 3  •  aspirin 81 MG EC tablet, Take 1 tablet by mouth Every Night., Disp: , Rfl:   •  esomeprazole (nexIUM) 40 MG capsule, Take 1 capsule by mouth Daily., Disp: 30 capsule, Rfl: 11  •  Fluticasone-Umeclidin-Vilant (Trelegy Ellipta) 200-62.5-25 MCG/ACT inhaler, Inhale 1 puff Daily., Disp: 1 each, Rfl: 11  •  ipratropium-albuterol (DUO-NEB) 0.5-2.5 mg/3 ml nebulizer, , Disp: , Rfl:   •  Multiple Vitamins-Minerals (SENTRY SENIOR PO), Take  by mouth Daily., Disp: , Rfl:   •   multivitamin (THERAGRAN) tablet tablet, Multi Vitamin, Disp: , Rfl:   •  rosuvastatin (CRESTOR) 40 MG tablet, Take 1 tablet by mouth Daily., Disp: 90 tablet, Rfl: 3  •  Sarilumab (Kevzara) 200 MG/1.14ML solution prefilled syringe, Inject 1 pen under the skin into the appropriate area as directed Every 14 (Fourteen) Days., Disp: , Rfl:           ROS  Review of Systems   Cardiovascular:  Positive for dyspnea on exertion. Negative for chest pain, irregular heartbeat, near-syncope, palpitations and syncope.   Respiratory:  Positive for cough, shortness of breath and wheezing. Negative for sputum production.    Neurological:  Positive for dizziness.     SOCIAL HX  Social History     Socioeconomic History   • Marital status:    Tobacco Use   • Smoking status: Every Day     Current packs/day: 1.00     Average packs/day: 1 pack/day for 59.3 years (59.3 ttl pk-yrs)     Types: Cigarettes     Start date: 1/1/1966     Passive exposure: Current   • Smokeless tobacco: Never   Vaping Use   • Vaping status: Never Used   Substance and Sexual Activity   • Alcohol use: No   • Drug use: No   • Sexual activity: Defer       FAMILY HX  Family History   Problem Relation Age of Onset   • No Known Problems Mother    • No Known Problems Father    • Asthma Brother    • Asthma Brother    • Colon cancer Neg Hx    • Colon polyps Neg Hx    • Esophageal cancer Neg Hx              Anthony Riley III, MD, [facility]

## 2025-05-16 ENCOUNTER — TELEPHONE (OUTPATIENT)
Age: 75
End: 2025-05-16
Payer: MEDICARE

## 2025-05-16 RX ORDER — DOXYCYCLINE 100 MG/1
200 CAPSULE ORAL ONCE
Qty: 2 CAPSULE | Refills: 0 | Status: SHIPPED | OUTPATIENT
Start: 2025-05-16 | End: 2025-05-16

## 2025-05-16 NOTE — TELEPHONE ENCOUNTER
Placed order for Doxy 200mg one time dose. Tick bite with erythema surrounding in large area, no target rash, on around 36hrs and removed in past 72hr. Counseled if changed to call. Viewed picture while wife in clinic for her appt.

## 2025-07-10 ENCOUNTER — HOSPITAL ENCOUNTER (OUTPATIENT)
Dept: CT IMAGING | Facility: HOSPITAL | Age: 75
Discharge: HOME | End: 2025-07-10
Admitting: INTERNAL MEDICINE
Payer: MEDICARE

## 2025-07-10 DIAGNOSIS — R93.89 ABNORMAL CHEST CT: ICD-10-CM

## 2025-07-10 PROCEDURE — 71250 CT THORAX DX C-: CPT

## 2025-07-24 ENCOUNTER — OFFICE VISIT (OUTPATIENT)
Age: 75
End: 2025-07-24
Payer: MEDICARE

## 2025-07-24 VITALS
BODY MASS INDEX: 22.13 KG/M2 | HEART RATE: 73 BPM | HEIGHT: 73 IN | SYSTOLIC BLOOD PRESSURE: 130 MMHG | DIASTOLIC BLOOD PRESSURE: 82 MMHG | WEIGHT: 167 LBS | OXYGEN SATURATION: 97 %

## 2025-07-24 DIAGNOSIS — R93.89 ABNORMAL CHEST CT: Primary | ICD-10-CM

## 2025-07-24 DIAGNOSIS — Z72.0 TOBACCO ABUSE: ICD-10-CM

## 2025-07-24 DIAGNOSIS — J44.9 COPD MIXED TYPE: Primary | ICD-10-CM

## 2025-07-24 DIAGNOSIS — J44.9 COPD MIXED TYPE: ICD-10-CM

## 2025-07-24 DIAGNOSIS — M05.79 RHEUMATOID ARTHRITIS INVOLVING MULTIPLE SITES WITH POSITIVE RHEUMATOID FACTOR: ICD-10-CM

## 2025-07-24 RX ORDER — FLUTICASONE FUROATE, UMECLIDINIUM BROMIDE AND VILANTEROL TRIFENATATE 200; 62.5; 25 UG/1; UG/1; UG/1
1 POWDER RESPIRATORY (INHALATION) DAILY
Qty: 1 EACH | Refills: 11 | Status: SHIPPED | OUTPATIENT
Start: 2025-07-24

## 2025-07-24 RX ORDER — SODIUM CHLORIDE FOR INHALATION 3 %
4 VIAL, NEBULIZER (ML) INHALATION 2 TIMES DAILY
Qty: 240 ML | Refills: 11 | Status: SHIPPED | OUTPATIENT
Start: 2025-07-24

## 2025-07-24 NOTE — PROGRESS NOTES
PULMONARY  NOTE    Chief Complaint     Normal CT scan of the chest, moderate COPD, tobacco abuse, rheumatoid arthritis    History of Present Illness     75-year-old male returns today for follow-up  I last saw him 8/14/2024    He has ongoing tobacco abuse with no plans for smoking cessation    He has moderate, stage II, chronic obstructive pulmonary disease  He is on Trelegy and albuterol    He has rheumatoid arthritis, previously on Humira  Currently on Kevzara    He has had abnormal chest imaging with pulmonary nodule but on his last scan he met criteria for just follow-up CT scan of the chest this year  He underwent a CT scan of the chest in July  Those results are as noted below    For the last several months he has noted increasing cough and congestion with difficulty expectorating sputum  He has not had any recent hemoptysis    Patient Active Problem List   Diagnosis    CAD (coronary artery disease)    Benign hypertension    Hypercholesterolemia    Tobacco abuse    Family history of heart disease    Positive cardiac stress test    Benign prostatic hyperplasia    GERD (gastroesophageal reflux disease)    [place] (hard of hearing)    Neck pain    Rheumatoid arthritis involving multiple sites with positive RF (On Kevzara)    Abnormal chest CT (Pulmonary nodules)    Moderate (Stage II) COPD    Hemoptysis (Resolved)    Pneumonia of left lower lobe due to infectious organism      No Known Allergies    Current Outpatient Medications:     albuterol sulfate  (90 Base) MCG/ACT inhaler, Inhale 2 puffs Every 4 (Four) Hours As Needed for Wheezing or Shortness of Air., Disp: 18 g, Rfl: 11    amLODIPine-valsartan (EXFORGE) 5-320 MG per tablet, TAKE (1) TABLET DAILY FOR HIGH BLOOD PRESSURE, Disp: 90 tablet, Rfl: 3    aspirin 81 MG EC tablet, Take 1 tablet by mouth Every Night., Disp: , Rfl:     esomeprazole (nexIUM) 40 MG capsule, Take 1 capsule by mouth Daily., Disp: 30 capsule, Rfl: 11    Fluticasone-Umeclidin-Vilant  "(Trelegy Ellipta) 200-62.5-25 MCG/ACT inhaler, Inhale 1 puff Daily., Disp: 1 each, Rfl: 11    ipratropium-albuterol (DUO-NEB) 0.5-2.5 mg/3 ml nebulizer, , Disp: , Rfl:     Multiple Vitamins-Minerals (SENTRY SENIOR PO), Take  by mouth Daily., Disp: , Rfl:     multivitamin (THERAGRAN) tablet tablet, Multi Vitamin, Disp: , Rfl:     rosuvastatin (CRESTOR) 40 MG tablet, Take 1 tablet by mouth Daily., Disp: 90 tablet, Rfl: 3    Sarilumab (Kevzara) 200 MG/1.14ML solution prefilled syringe, Inject 1 pen under the skin into the appropriate area as directed Every 14 (Fourteen) Days., Disp: , Rfl:   MEDICATION LIST AND ALLERGIES REVIEWED.    Family History   Problem Relation Age of Onset    No Known Problems Mother     No Known Problems Father     Asthma Brother     Asthma Brother     Colon cancer Neg Hx     Colon polyps Neg Hx     Esophageal cancer Neg Hx      Social History     Tobacco Use    Smoking status: Every Day     Current packs/day: 1.00     Average packs/day: 1 pack/day for 59.6 years (59.6 ttl pk-yrs)     Types: Cigarettes     Start date: 1/1/1966     Passive exposure: Current    Smokeless tobacco: Never   Vaping Use    Vaping status: Never Used   Substance Use Topics    Alcohol use: No    Drug use: No     Social History     Social History Narrative    Not on file     FAMILY AND SOCIAL HISTORY REVIEWED.    Review of Systems  IF PRESENT REFER TO SCANNED ROS SHEET FROM SAME DATE  OTHERWISE ROS OBTAINED AND NON-CONTRIBUTORY OVER HPI.    /82   Pulse 73   Ht 185.4 cm (73\")   Wt 75.8 kg (167 lb)   SpO2 97%   BMI 22.03 kg/m²   Physical Exam  Vitals and nursing note reviewed.   Constitutional:       General: He is not in acute distress.     Appearance: He is well-developed. He is not diaphoretic.   HENT:      Head: Normocephalic and atraumatic.   Neck:      Thyroid: No thyromegaly.   Cardiovascular:      Rate and Rhythm: Normal rate and regular rhythm.      Heart sounds: Normal heart sounds. No murmur " heard.  Pulmonary:      Effort: Pulmonary effort is normal.      Breath sounds: Normal breath sounds. No stridor.   Lymphadenopathy:      Cervical: No cervical adenopathy.      Upper Body:      Right upper body: No supraclavicular or epitrochlear adenopathy.      Left upper body: No supraclavicular or epitrochlear adenopathy.   Skin:     General: Skin is warm and dry.   Neurological:      Mental Status: He is alert.   Psychiatric:         Behavior: Behavior normal.         Results     CT scan of the chest from 7/10/2025 reviewed on PACS  My interpretation as noted below    Immunization History   Administered Date(s) Administered    COVID-19 (MODERNA) 1st,2nd,3rd Dose Monovalent 03/04/2021, 04/09/2021, 01/05/2022    Flublock Quad =>18yrs 12/10/2019    Fluzone High-Dose 65+YRS 10/22/2021    Fluzone High-Dose 65+yrs 12/07/2022    Influenza recombinant 12/10/2019    Pneumococcal Conjugate 13-Valent (PCV13) 06/05/2017    Pneumococcal Conjugate 20-Valent (PCV20) 08/14/2024    Shingrix 01/10/2020    Tdap 09/09/2016, 05/20/2024     Problem List       ICD-10-CM ICD-9-CM   1. Abnormal chest CT (Pulmonary nodules)  R93.89 793.2   2. Moderate (Stage II) COPD  J44.9 496   3. Tobacco abuse  Z72.0 305.1   4. Rheumatoid arthritis involving multiple sites with positive RF (On Kevzara)  M05.79 714.0       Discussion     We reviewed his CT scan of the chest together on PACS  I would recommend a 6-month follow-up CT scan of the chest for the parenchymal opacity  It may be an area of mucous plugging.    We also reviewed the tracheal web  There is no sign of a similar tracheal process on prior CT scans of the chest  It is possible this may just simply be mucus in the airway.  However, with his worsening cough and difficulty expectorating secretions he may have a structural process in the trachea and that may need to be addressed more aggressively  Therefore, I have recommended a bronchoscopy to evaluate his airways    In the meantime I  am going to prescribe 3% hypertonic saline to be used in his nebulizer to help with mucus clearance    We again discussed the need for smoking cessation but there is no plans for smoking cessation at this time    This visit represents an established relationship with whom the provider is providing ongoing longitudinal care related to serious and/or complex conditions    Level of service justified based on 42 minutes spent in patient care on this date of service including, but not limited to: preparing to see the patient, obtaining and/or reviewing history, performing medically appropriate examination, ordering tests/medicine/procedures, independently interpreting results, documenting clinical information in EHR, and counseling/education of patient/family/caregiver (excluding time spent on other separate services such as performing procedures or test interpretation, if applicable). (Level 4 30-39 minutes; Level 5 40-54 minutes)    Anthony Turner MD  Note electronically signed    CC: Kindra White MD

## 2025-07-25 ENCOUNTER — PREP FOR SURGERY (OUTPATIENT)
Dept: OTHER | Facility: HOSPITAL | Age: 75
End: 2025-07-25
Payer: MEDICARE

## 2025-07-25 DIAGNOSIS — R93.89 ABNORMAL CHEST CT: Primary | ICD-10-CM

## 2025-07-27 RX ORDER — SODIUM CHLORIDE 9 MG/ML
40 INJECTION, SOLUTION INTRAVENOUS AS NEEDED
OUTPATIENT
Start: 2025-07-27

## 2025-07-27 RX ORDER — SODIUM CHLORIDE 0.9 % (FLUSH) 0.9 %
3 SYRINGE (ML) INJECTION EVERY 12 HOURS SCHEDULED
OUTPATIENT
Start: 2025-07-27

## 2025-07-27 RX ORDER — SODIUM CHLORIDE 0.9 % (FLUSH) 0.9 %
10 SYRINGE (ML) INJECTION AS NEEDED
OUTPATIENT
Start: 2025-07-27

## 2025-07-27 RX ORDER — LIDOCAINE HYDROCHLORIDE 40 MG/ML
4 INJECTION, SOLUTION RETROBULBAR; TOPICAL ONCE
OUTPATIENT
Start: 2025-07-27 | End: 2025-07-27

## 2025-08-15 DIAGNOSIS — K21.00 GASTROESOPHAGEAL REFLUX DISEASE WITH ESOPHAGITIS WITHOUT HEMORRHAGE: ICD-10-CM

## 2025-08-15 RX ORDER — ESOMEPRAZOLE MAGNESIUM 40 MG/1
40 CAPSULE, DELAYED RELEASE ORAL DAILY
Qty: 90 CAPSULE | Refills: 3 | Status: SHIPPED | OUTPATIENT
Start: 2025-08-15

## 2025-08-26 ENCOUNTER — TELEPHONE (OUTPATIENT)
Dept: CARDIOLOGY | Facility: CLINIC | Age: 75
End: 2025-08-26
Payer: MEDICARE

## 2025-08-27 ENCOUNTER — PRE-ADMISSION TESTING (OUTPATIENT)
Dept: PREADMISSION TESTING | Facility: HOSPITAL | Age: 75
End: 2025-08-27
Payer: MEDICARE

## 2025-08-27 VITALS — HEIGHT: 72 IN | BODY MASS INDEX: 22.5 KG/M2 | WEIGHT: 166.12 LBS

## 2025-08-27 DIAGNOSIS — R93.89 ABNORMAL CHEST CT: ICD-10-CM

## 2025-08-27 LAB
ALBUMIN SERPL-MCNC: 4.4 G/DL (ref 3.5–5.2)
ALBUMIN/GLOB SERPL: 2 G/DL
ALP SERPL-CCNC: 62 U/L (ref 39–117)
ALT SERPL W P-5'-P-CCNC: 37 U/L (ref 1–41)
ANION GAP SERPL CALCULATED.3IONS-SCNC: 8.8 MMOL/L (ref 5–15)
APTT PPP: 25.4 SECONDS (ref 22–39)
AST SERPL-CCNC: 44 U/L (ref 1–40)
BASOPHILS # BLD AUTO: 0.04 10*3/MM3 (ref 0–0.2)
BASOPHILS NFR BLD AUTO: 1.3 % (ref 0–1.5)
BILIRUB SERPL-MCNC: 0.9 MG/DL (ref 0–1.2)
BUN SERPL-MCNC: 12.9 MG/DL (ref 8–23)
BUN/CREAT SERPL: 14 (ref 7–25)
CALCIUM SPEC-SCNC: 9.3 MG/DL (ref 8.6–10.5)
CHLORIDE SERPL-SCNC: 106 MMOL/L (ref 98–107)
CO2 SERPL-SCNC: 26.2 MMOL/L (ref 22–29)
CREAT SERPL-MCNC: 0.92 MG/DL (ref 0.76–1.27)
DEPRECATED RDW RBC AUTO: 45.8 FL (ref 37–54)
EGFRCR SERPLBLD CKD-EPI 2021: 87.8 ML/MIN/1.73
EOSINOPHIL # BLD AUTO: 0.08 10*3/MM3 (ref 0–0.4)
EOSINOPHIL NFR BLD AUTO: 2.6 % (ref 0.3–6.2)
ERYTHROCYTE [DISTWIDTH] IN BLOOD BY AUTOMATED COUNT: 12.5 % (ref 12.3–15.4)
GLOBULIN UR ELPH-MCNC: 2.2 GM/DL
GLUCOSE SERPL-MCNC: 136 MG/DL (ref 65–99)
HCT VFR BLD AUTO: 44.2 % (ref 37.5–51)
HGB BLD-MCNC: 15.1 G/DL (ref 13–17.7)
IMM GRANULOCYTES # BLD AUTO: 0.01 10*3/MM3 (ref 0–0.05)
IMM GRANULOCYTES NFR BLD AUTO: 0.3 % (ref 0–0.5)
INR PPP: 0.97 (ref 0.89–1.12)
LYMPHOCYTES # BLD AUTO: 0.94 10*3/MM3 (ref 0.7–3.1)
LYMPHOCYTES NFR BLD AUTO: 30.4 % (ref 19.6–45.3)
MCH RBC QN AUTO: 33.9 PG (ref 26.6–33)
MCHC RBC AUTO-ENTMCNC: 34.2 G/DL (ref 31.5–35.7)
MCV RBC AUTO: 99.3 FL (ref 79–97)
MONOCYTES # BLD AUTO: 0.46 10*3/MM3 (ref 0.1–0.9)
MONOCYTES NFR BLD AUTO: 14.9 % (ref 5–12)
NEUTROPHILS NFR BLD AUTO: 1.56 10*3/MM3 (ref 1.7–7)
NEUTROPHILS NFR BLD AUTO: 50.5 % (ref 42.7–76)
NRBC BLD AUTO-RTO: 0 /100 WBC (ref 0–0.2)
PLATELET # BLD AUTO: 192 10*3/MM3 (ref 140–450)
PMV BLD AUTO: 10.3 FL (ref 6–12)
POTASSIUM SERPL-SCNC: 3.8 MMOL/L (ref 3.5–5.2)
PROT SERPL-MCNC: 6.6 G/DL (ref 6–8.5)
PROTHROMBIN TIME: 13.5 SECONDS (ref 12.2–15.3)
RBC # BLD AUTO: 4.45 10*6/MM3 (ref 4.14–5.8)
SODIUM SERPL-SCNC: 141 MMOL/L (ref 136–145)
WBC NRBC COR # BLD AUTO: 3.09 10*3/MM3 (ref 3.4–10.8)

## 2025-08-27 PROCEDURE — 36415 COLL VENOUS BLD VENIPUNCTURE: CPT

## 2025-08-27 PROCEDURE — 85025 COMPLETE CBC W/AUTO DIFF WBC: CPT

## 2025-08-27 PROCEDURE — 85610 PROTHROMBIN TIME: CPT

## 2025-08-27 PROCEDURE — 85730 THROMBOPLASTIN TIME PARTIAL: CPT

## 2025-08-27 PROCEDURE — 80053 COMPREHEN METABOLIC PANEL: CPT

## (undated) DEVICE — TUBING, SUCTION, 1/4" X 10', STRAIGHT: Brand: MEDLINE

## (undated) DEVICE — SINGLE-USE BIOPSY FORCEPS: Brand: RADIAL JAW 4

## (undated) DEVICE — LUBE GEL ENDOGLIDE 1.1OZ

## (undated) DEVICE — CONTN GRAD MEAS TRIANG 32OZ BLK

## (undated) DEVICE — KT ORCA ORCAPOD DISP STRL

## (undated) DEVICE — SYR LUERLOK 50ML

## (undated) DEVICE — THE BITE BLOCK MAXI, LATEX FREE STRAP IS USED TO PROTECT THE ENDOSCOPE INSERTION TUBE FROM BEING BITTEN BY THE PATIENT.

## (undated) DEVICE — ENDOGATOR HYBRID TUBING KIT FOR USE WITH ENDOGATOR IRRIGATION PUMP, OLYMPUS PUMP, GI4000 ESU, AND TORRENT IRRIGATION PUMP.: Brand: ENDOGATOR KIT

## (undated) DEVICE — INTRO ACCSR BLNT TP

## (undated) DEVICE — SOL IRR H2O BTL 1000ML STRL